# Patient Record
Sex: FEMALE
[De-identification: names, ages, dates, MRNs, and addresses within clinical notes are randomized per-mention and may not be internally consistent; named-entity substitution may affect disease eponyms.]

---

## 2017-02-15 ENCOUNTER — RX RENEWAL (OUTPATIENT)
Age: 62
End: 2017-02-15

## 2017-02-23 ENCOUNTER — APPOINTMENT (OUTPATIENT)
Dept: FAMILY MEDICINE | Facility: CLINIC | Age: 62
End: 2017-02-23

## 2017-03-28 ENCOUNTER — MEDICATION RENEWAL (OUTPATIENT)
Age: 62
End: 2017-03-28

## 2017-03-28 ENCOUNTER — APPOINTMENT (OUTPATIENT)
Dept: FAMILY MEDICINE | Facility: CLINIC | Age: 62
End: 2017-03-28

## 2017-03-28 ENCOUNTER — RX RENEWAL (OUTPATIENT)
Age: 62
End: 2017-03-28

## 2017-03-28 VITALS
SYSTOLIC BLOOD PRESSURE: 120 MMHG | DIASTOLIC BLOOD PRESSURE: 80 MMHG | HEIGHT: 61 IN | HEART RATE: 77 BPM | RESPIRATION RATE: 16 BRPM | BODY MASS INDEX: 37.19 KG/M2 | WEIGHT: 197 LBS | OXYGEN SATURATION: 98 %

## 2017-03-28 RX ORDER — CALCIPOTRIENE AND BETAMETHASONE DIPROPIONATE 50; .5 UG/G; MG/G
0.005-0.064 OINTMENT TOPICAL
Qty: 60 | Refills: 0 | Status: ACTIVE | COMMUNITY
Start: 2016-12-23

## 2017-04-12 ENCOUNTER — RX RENEWAL (OUTPATIENT)
Age: 62
End: 2017-04-12

## 2017-04-17 LAB
ALBUMIN SERPL ELPH-MCNC: 3.8 G/DL
ALP BLD-CCNC: 75 U/L
ALT SERPL-CCNC: 21 U/L
ANION GAP SERPL CALC-SCNC: 16 MMOL/L
APPEARANCE: CLEAR
AST SERPL-CCNC: 13 U/L
BASOPHILS # BLD AUTO: 0.05 K/UL
BASOPHILS NFR BLD AUTO: 0.5 %
BILIRUB SERPL-MCNC: 0.3 MG/DL
BILIRUBIN URINE: NEGATIVE
BLOOD URINE: NEGATIVE
BUN SERPL-MCNC: 12 MG/DL
CALCIUM SERPL-MCNC: 9.2 MG/DL
CHLORIDE SERPL-SCNC: 102 MMOL/L
CHOLEST SERPL-MCNC: 146 MG/DL
CHOLEST/HDLC SERPL: 2.6 RATIO
CO2 SERPL-SCNC: 24 MMOL/L
COLOR: YELLOW
CREAT SERPL-MCNC: 0.75 MG/DL
CREAT SPEC-SCNC: 98 MG/DL
EOSINOPHIL # BLD AUTO: 0.45 K/UL
EOSINOPHIL NFR BLD AUTO: 4.3 %
GLUCOSE QUALITATIVE U: NORMAL MG/DL
GLUCOSE SERPL-MCNC: 111 MG/DL
HBA1C MFR BLD HPLC: 7 %
HCT VFR BLD CALC: 40.6 %
HDLC SERPL-MCNC: 57 MG/DL
HGB BLD-MCNC: 12.7 G/DL
IMM GRANULOCYTES NFR BLD AUTO: 0.6 %
KETONES URINE: NEGATIVE
LDLC SERPL CALC-MCNC: 68 MG/DL
LEUKOCYTE ESTERASE URINE: NEGATIVE
LYMPHOCYTES # BLD AUTO: 3.82 K/UL
LYMPHOCYTES NFR BLD AUTO: 36.2 %
MAN DIFF?: NORMAL
MCHC RBC-ENTMCNC: 28 PG
MCHC RBC-ENTMCNC: 31.3 GM/DL
MCV RBC AUTO: 89.6 FL
MICROALBUMIN 24H UR DL<=1MG/L-MCNC: 0.9 MG/DL
MICROALBUMIN/CREAT 24H UR-RTO: 9 UG/MG
MONOCYTES # BLD AUTO: 0.64 K/UL
MONOCYTES NFR BLD AUTO: 6.1 %
NEUTROPHILS # BLD AUTO: 5.53 K/UL
NEUTROPHILS NFR BLD AUTO: 52.3 %
NITRITE URINE: NEGATIVE
PH URINE: 5
PLATELET # BLD AUTO: 381 K/UL
POTASSIUM SERPL-SCNC: 4.1 MMOL/L
PROT SERPL-MCNC: 7.7 G/DL
PROTEIN URINE: NEGATIVE MG/DL
RBC # BLD: 4.53 M/UL
RBC # FLD: 12.6 %
SODIUM SERPL-SCNC: 142 MMOL/L
SPECIFIC GRAVITY URINE: 1.02
T4 SERPL-MCNC: 9.6 UG/DL
TRIGL SERPL-MCNC: 105 MG/DL
TSH SERPL-ACNC: 0.12 UIU/ML
URATE SERPL-MCNC: 6.4 MG/DL
UROBILINOGEN URINE: NORMAL MG/DL
WBC # FLD AUTO: 10.55 K/UL

## 2017-04-18 ENCOUNTER — RX RENEWAL (OUTPATIENT)
Age: 62
End: 2017-04-18

## 2017-06-05 ENCOUNTER — APPOINTMENT (OUTPATIENT)
Dept: FAMILY MEDICINE | Facility: CLINIC | Age: 62
End: 2017-06-05

## 2017-06-05 VITALS
BODY MASS INDEX: 38.7 KG/M2 | HEART RATE: 84 BPM | OXYGEN SATURATION: 97 % | WEIGHT: 197.13 LBS | SYSTOLIC BLOOD PRESSURE: 138 MMHG | DIASTOLIC BLOOD PRESSURE: 78 MMHG | HEIGHT: 60 IN

## 2017-06-09 RX ORDER — CYANOCOBALAMIN 1000 UG/ML
1000 INJECTION INTRAMUSCULAR; SUBCUTANEOUS
Qty: 0 | Refills: 0 | Status: COMPLETED | OUTPATIENT
Start: 2017-06-09

## 2017-06-09 RX ADMIN — CYANOCOBALAMIN 0 MCG/ML: 1000 INJECTION INTRAMUSCULAR; SUBCUTANEOUS at 00:00

## 2017-07-13 ENCOUNTER — APPOINTMENT (OUTPATIENT)
Dept: FAMILY MEDICINE | Facility: CLINIC | Age: 62
End: 2017-07-13

## 2017-07-13 VITALS
BODY MASS INDEX: 38.68 KG/M2 | HEIGHT: 60 IN | SYSTOLIC BLOOD PRESSURE: 132 MMHG | DIASTOLIC BLOOD PRESSURE: 82 MMHG | WEIGHT: 197 LBS

## 2017-07-16 RX ORDER — METHYLPRED ACET/NACL,ISO-OS/PF 40 MG/ML
40 VIAL (ML) INJECTION
Qty: 1 | Refills: 0 | Status: COMPLETED | OUTPATIENT
Start: 2017-07-16

## 2017-07-16 RX ADMIN — METHYLPREDNISOLONE ACETATE 0 MG/ML: 40 INJECTION, SUSPENSION INTRA-ARTICULAR; INTRALESIONAL; INTRAMUSCULAR; SOFT TISSUE at 00:00

## 2017-08-28 ENCOUNTER — RX RENEWAL (OUTPATIENT)
Age: 62
End: 2017-08-28

## 2017-08-30 ENCOUNTER — MOBILE ON CALL (OUTPATIENT)
Age: 62
End: 2017-08-30

## 2017-09-05 ENCOUNTER — APPOINTMENT (OUTPATIENT)
Dept: FAMILY MEDICINE | Facility: CLINIC | Age: 62
End: 2017-09-05
Payer: COMMERCIAL

## 2017-09-05 ENCOUNTER — LABORATORY RESULT (OUTPATIENT)
Age: 62
End: 2017-09-05

## 2017-09-05 VITALS
HEIGHT: 60 IN | WEIGHT: 197 LBS | OXYGEN SATURATION: 98 % | TEMPERATURE: 98.2 F | RESPIRATION RATE: 16 BRPM | BODY MASS INDEX: 38.68 KG/M2 | SYSTOLIC BLOOD PRESSURE: 130 MMHG | HEART RATE: 69 BPM | DIASTOLIC BLOOD PRESSURE: 82 MMHG
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
## 2017-09-05 DIAGNOSIS — R53.83 OTHER FATIGUE: ICD-10-CM

## 2017-09-05 DIAGNOSIS — M76.32 ILIOTIBIAL BAND SYNDROME, LEFT LEG: ICD-10-CM

## 2017-09-05 DIAGNOSIS — M79.652 PAIN IN LEFT THIGH: ICD-10-CM

## 2017-09-05 PROCEDURE — 99214 OFFICE O/P EST MOD 30 MIN: CPT | Mod: 25

## 2017-09-05 PROCEDURE — 36415 COLL VENOUS BLD VENIPUNCTURE: CPT

## 2017-09-08 LAB
ALBUMIN SERPL ELPH-MCNC: 3.8 G/DL
ALP BLD-CCNC: 80 U/L
ALT SERPL-CCNC: 25 U/L
ANION GAP SERPL CALC-SCNC: 18 MMOL/L
APPEARANCE: ABNORMAL
AST SERPL-CCNC: 18 U/L
B BURGDOR IGG+IGM SER QL IB: NORMAL
BACTERIA: ABNORMAL
BASOPHILS # BLD AUTO: 0.04 K/UL
BASOPHILS NFR BLD AUTO: 0.4 %
BILIRUB SERPL-MCNC: 0.4 MG/DL
BILIRUBIN URINE: NEGATIVE
BLOOD URINE: NEGATIVE
BUN SERPL-MCNC: 12 MG/DL
CALCIUM OXALATE CRYSTALS: ABNORMAL
CALCIUM SERPL-MCNC: 9.2 MG/DL
CHLORIDE SERPL-SCNC: 102 MMOL/L
CHOLEST SERPL-MCNC: 177 MG/DL
CHOLEST/HDLC SERPL: 3.2 RATIO
CK SERPL-CCNC: 133 U/L
CO2 SERPL-SCNC: 22 MMOL/L
COLOR: YELLOW
CREAT SERPL-MCNC: 0.78 MG/DL
EOSINOPHIL # BLD AUTO: 0.19 K/UL
EOSINOPHIL NFR BLD AUTO: 1.9 %
GLUCOSE QUALITATIVE U: NORMAL MG/DL
GLUCOSE SERPL-MCNC: 111 MG/DL
HBA1C MFR BLD HPLC: 7.3 %
HCT VFR BLD CALC: 38.4 %
HDLC SERPL-MCNC: 55 MG/DL
HETEROPH AB SER QL: NEGATIVE
HGB BLD-MCNC: 12.3 G/DL
HYALINE CASTS: 0 /LPF
IMM GRANULOCYTES NFR BLD AUTO: 0.3 %
KETONES URINE: NEGATIVE
LDLC SERPL CALC-MCNC: 97 MG/DL
LEUKOCYTE ESTERASE URINE: ABNORMAL
LYMPHOCYTES # BLD AUTO: 3.94 K/UL
LYMPHOCYTES NFR BLD AUTO: 40.1 %
MAN DIFF?: NORMAL
MCHC RBC-ENTMCNC: 29.1 PG
MCHC RBC-ENTMCNC: 32 GM/DL
MCV RBC AUTO: 91 FL
MICROSCOPIC-UA: NORMAL
MONOCYTES # BLD AUTO: 0.52 K/UL
MONOCYTES NFR BLD AUTO: 5.3 %
NEUTROPHILS # BLD AUTO: 5.1 K/UL
NEUTROPHILS NFR BLD AUTO: 52 %
NITRITE URINE: NEGATIVE
PH URINE: 5
PLATELET # BLD AUTO: 327 K/UL
POTASSIUM SERPL-SCNC: 4.1 MMOL/L
PROT SERPL-MCNC: 7.9 G/DL
PROTEIN URINE: NEGATIVE MG/DL
RBC # BLD: 4.22 M/UL
RBC # FLD: 13.4 %
RED BLOOD CELLS URINE: 0 /HPF
SODIUM SERPL-SCNC: 142 MMOL/L
SPECIFIC GRAVITY URINE: 1.02
SQUAMOUS EPITHELIAL CELLS: 9 /HPF
T4 FREE SERPL-MCNC: 1.8 NG/DL
TRIGL SERPL-MCNC: 126 MG/DL
TSH SERPL-ACNC: 0.21 UIU/ML
UROBILINOGEN URINE: NORMAL MG/DL
VIT B12 SERPL-MCNC: 410 PG/ML
WBC # FLD AUTO: 9.82 K/UL
WHITE BLOOD CELLS URINE: 7 /HPF

## 2017-09-11 LAB
BABESIA ANTIBODIES, IGG: NORMAL
BABESIA ANTIBODIES, IGM: NORMAL

## 2017-09-14 ENCOUNTER — MED ADMIN CHARGE (OUTPATIENT)
Age: 62
End: 2017-09-14

## 2017-12-11 ENCOUNTER — RX RENEWAL (OUTPATIENT)
Age: 62
End: 2017-12-11

## 2017-12-12 ENCOUNTER — APPOINTMENT (OUTPATIENT)
Dept: FAMILY MEDICINE | Facility: CLINIC | Age: 62
End: 2017-12-12

## 2017-12-15 ENCOUNTER — RX RENEWAL (OUTPATIENT)
Age: 62
End: 2017-12-15

## 2017-12-20 ENCOUNTER — RX RENEWAL (OUTPATIENT)
Age: 62
End: 2017-12-20

## 2017-12-28 ENCOUNTER — APPOINTMENT (OUTPATIENT)
Dept: FAMILY MEDICINE | Facility: CLINIC | Age: 62
End: 2017-12-28
Payer: COMMERCIAL

## 2017-12-28 VITALS
WEIGHT: 196.38 LBS | DIASTOLIC BLOOD PRESSURE: 62 MMHG | BODY MASS INDEX: 38.56 KG/M2 | HEIGHT: 60 IN | SYSTOLIC BLOOD PRESSURE: 124 MMHG

## 2017-12-28 DIAGNOSIS — J01.00 ACUTE MAXILLARY SINUSITIS, UNSPECIFIED: ICD-10-CM

## 2017-12-28 PROCEDURE — 99214 OFFICE O/P EST MOD 30 MIN: CPT | Mod: 25

## 2017-12-28 PROCEDURE — 36415 COLL VENOUS BLD VENIPUNCTURE: CPT

## 2018-01-05 LAB
ALBUMIN SERPL ELPH-MCNC: 4.2 G/DL
ALP BLD-CCNC: 94 U/L
ALT SERPL-CCNC: 25 U/L
ANION GAP SERPL CALC-SCNC: 18 MMOL/L
AST SERPL-CCNC: 24 U/L
BILIRUB SERPL-MCNC: 0.3 MG/DL
BUN SERPL-MCNC: 14 MG/DL
CALCIUM SERPL-MCNC: 9.6 MG/DL
CHLORIDE SERPL-SCNC: 99 MMOL/L
CHOLEST SERPL-MCNC: 173 MG/DL
CHOLEST/HDLC SERPL: 3.1 RATIO
CO2 SERPL-SCNC: 25 MMOL/L
CREAT SERPL-MCNC: 1.11 MG/DL
GLUCOSE SERPL-MCNC: 208 MG/DL
HBA1C MFR BLD HPLC: 7.2 %
HDLC SERPL-MCNC: 56 MG/DL
LDLC SERPL CALC-MCNC: 71 MG/DL
POTASSIUM SERPL-SCNC: 4.4 MMOL/L
PROT SERPL-MCNC: 8.2 G/DL
SODIUM SERPL-SCNC: 142 MMOL/L
T4 FREE SERPL-MCNC: 1.6 NG/DL
TRIGL SERPL-MCNC: 231 MG/DL
TSH SERPL-ACNC: 1.62 UIU/ML
VIT B12 SERPL-MCNC: >2000 PG/ML

## 2018-03-26 ENCOUNTER — APPOINTMENT (OUTPATIENT)
Dept: FAMILY MEDICINE | Facility: CLINIC | Age: 63
End: 2018-03-26

## 2018-03-27 ENCOUNTER — APPOINTMENT (OUTPATIENT)
Dept: FAMILY MEDICINE | Facility: CLINIC | Age: 63
End: 2018-03-27
Payer: COMMERCIAL

## 2018-03-27 VITALS — DIASTOLIC BLOOD PRESSURE: 88 MMHG | SYSTOLIC BLOOD PRESSURE: 132 MMHG

## 2018-03-27 VITALS
WEIGHT: 200 LBS | DIASTOLIC BLOOD PRESSURE: 90 MMHG | BODY MASS INDEX: 39.27 KG/M2 | SYSTOLIC BLOOD PRESSURE: 140 MMHG | HEIGHT: 60 IN

## 2018-03-27 PROCEDURE — 36415 COLL VENOUS BLD VENIPUNCTURE: CPT

## 2018-03-27 PROCEDURE — 99214 OFFICE O/P EST MOD 30 MIN: CPT | Mod: 25

## 2018-03-27 RX ORDER — AMOXICILLIN 875 MG/1
875 TABLET, FILM COATED ORAL
Qty: 1 | Refills: 0 | Status: COMPLETED | COMMUNITY
Start: 2017-12-28 | End: 2018-03-27

## 2018-03-27 RX ORDER — PREDNISONE 20 MG/1
20 TABLET ORAL DAILY
Qty: 30 | Refills: 0 | Status: COMPLETED | COMMUNITY
Start: 2017-07-13 | End: 2018-03-27

## 2018-04-03 LAB
ALBUMIN SERPL ELPH-MCNC: 4.1 G/DL
ALP BLD-CCNC: 85 U/L
ALT SERPL-CCNC: 19 U/L
ANION GAP SERPL CALC-SCNC: 13 MMOL/L
AST SERPL-CCNC: 19 U/L
BILIRUB SERPL-MCNC: <0.2 MG/DL
BUN SERPL-MCNC: 17 MG/DL
CALCIUM SERPL-MCNC: 9.4 MG/DL
CHLORIDE SERPL-SCNC: 103 MMOL/L
CHOLEST SERPL-MCNC: 184 MG/DL
CHOLEST/HDLC SERPL: 3.6 RATIO
CO2 SERPL-SCNC: 24 MMOL/L
CREAT SERPL-MCNC: 0.82 MG/DL
GLUCOSE SERPL-MCNC: 107 MG/DL
HBA1C MFR BLD HPLC: 7.1 %
HDLC SERPL-MCNC: 51 MG/DL
LDLC SERPL CALC-MCNC: 100 MG/DL
POTASSIUM SERPL-SCNC: 4.4 MMOL/L
PROT SERPL-MCNC: 7.9 G/DL
SODIUM SERPL-SCNC: 140 MMOL/L
T4 FREE SERPL-MCNC: 1.4 NG/DL
TRIGL SERPL-MCNC: 165 MG/DL
TSH SERPL-ACNC: 3.4 UIU/ML
VIT B12 SERPL-MCNC: 443 PG/ML

## 2018-05-03 ENCOUNTER — APPOINTMENT (OUTPATIENT)
Dept: FAMILY MEDICINE | Facility: CLINIC | Age: 63
End: 2018-05-03
Payer: COMMERCIAL

## 2018-05-03 VITALS
BODY MASS INDEX: 39.27 KG/M2 | DIASTOLIC BLOOD PRESSURE: 72 MMHG | OXYGEN SATURATION: 98 % | WEIGHT: 200 LBS | SYSTOLIC BLOOD PRESSURE: 136 MMHG | RESPIRATION RATE: 16 BRPM | HEART RATE: 75 BPM | HEIGHT: 60 IN

## 2018-05-03 DIAGNOSIS — R29.898 OTHER SYMPTOMS AND SIGNS INVOLVING THE MUSCULOSKELETAL SYSTEM: ICD-10-CM

## 2018-05-03 DIAGNOSIS — M25.511 PAIN IN RIGHT SHOULDER: ICD-10-CM

## 2018-05-03 PROCEDURE — 99203 OFFICE O/P NEW LOW 30 MIN: CPT

## 2018-06-11 ENCOUNTER — RX RENEWAL (OUTPATIENT)
Age: 63
End: 2018-06-11

## 2018-06-25 ENCOUNTER — RX RENEWAL (OUTPATIENT)
Age: 63
End: 2018-06-25

## 2018-06-27 ENCOUNTER — RX RENEWAL (OUTPATIENT)
Age: 63
End: 2018-06-27

## 2018-09-04 ENCOUNTER — APPOINTMENT (OUTPATIENT)
Dept: FAMILY MEDICINE | Facility: CLINIC | Age: 63
End: 2018-09-04
Payer: MEDICARE

## 2018-09-04 VITALS
DIASTOLIC BLOOD PRESSURE: 68 MMHG | OXYGEN SATURATION: 98 % | HEIGHT: 61 IN | SYSTOLIC BLOOD PRESSURE: 118 MMHG | HEART RATE: 68 BPM | BODY MASS INDEX: 37.41 KG/M2 | WEIGHT: 198.13 LBS
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
## 2018-09-04 PROCEDURE — 96372 THER/PROPH/DIAG INJ SC/IM: CPT

## 2018-09-04 PROCEDURE — 99214 OFFICE O/P EST MOD 30 MIN: CPT | Mod: 25

## 2018-09-04 RX ORDER — CYANOCOBALAMIN 1000 UG/ML
1000 INJECTION INTRAMUSCULAR; SUBCUTANEOUS
Qty: 0 | Refills: 0 | Status: COMPLETED | OUTPATIENT
Start: 2018-09-04

## 2018-09-04 RX ORDER — OMEPRAZOLE 20 MG/1
20 CAPSULE, DELAYED RELEASE ORAL DAILY
Qty: 20 | Refills: 0 | Status: COMPLETED | COMMUNITY
Start: 2017-07-13 | End: 2018-09-04

## 2018-09-04 RX ORDER — HYDROCORTISONE 10 MG/G
1 CREAM TOPICAL TWICE DAILY
Qty: 1 | Refills: 0 | Status: COMPLETED | COMMUNITY
Start: 2017-07-13 | End: 2018-09-04

## 2018-09-04 RX ADMIN — CYANOCOBALAMIN 0 MCG/ML: 1000 INJECTION INTRAMUSCULAR; SUBCUTANEOUS at 00:00

## 2018-09-04 NOTE — ASSESSMENT
[FreeTextEntry1] : DM- refill metformin, glimepiride. check hba1c\par HLD - refill statin, check flp, hepatic function\par htn- refill carvedilol\par hypothyroidism - refill levothyroxine, check TFTs\par Low back pain w/ sciatica - refill ibuprofen prn\par b12 defic - cyanocobalamin 1000 administered, pt consent given before administration and after discussion of risks/benefits, pt tolerated well.\par bug bites - hydocortisone bid prn

## 2018-09-04 NOTE — HISTORY OF PRESENT ILLNESS
[de-identified] : Pt for f/u multiple problems. Pt's DM stable, has not improved diet significantly. Pt sees ophtho q 3 months. Pt needs refills of meds for hld, htn, hypothyroidism. Denies CP, palpitations, dyspnea, n/v\par Pt c/o itchy eyes for past few weeks. Pt c/o mosquito bites would like cortisone cream.

## 2018-09-05 ENCOUNTER — RX RENEWAL (OUTPATIENT)
Age: 63
End: 2018-09-05

## 2018-09-06 LAB
ALBUMIN SERPL ELPH-MCNC: 4.2 G/DL
ALP BLD-CCNC: 78 U/L
ALT SERPL-CCNC: 17 U/L
ANION GAP SERPL CALC-SCNC: 16 MMOL/L
AST SERPL-CCNC: 23 U/L
BILIRUB SERPL-MCNC: 0.2 MG/DL
BUN SERPL-MCNC: 16 MG/DL
CALCIUM SERPL-MCNC: 9.6 MG/DL
CHLORIDE SERPL-SCNC: 104 MMOL/L
CHOLEST SERPL-MCNC: 136 MG/DL
CHOLEST/HDLC SERPL: 2.8 RATIO
CO2 SERPL-SCNC: 24 MMOL/L
CREAT SERPL-MCNC: 0.92 MG/DL
GLUCOSE SERPL-MCNC: 97 MG/DL
HBA1C MFR BLD HPLC: 6.5 %
HDLC SERPL-MCNC: 48 MG/DL
LDLC SERPL CALC-MCNC: 67 MG/DL
POTASSIUM SERPL-SCNC: 4.1 MMOL/L
PROT SERPL-MCNC: 8 G/DL
SODIUM SERPL-SCNC: 144 MMOL/L
T4 FREE SERPL-MCNC: 2.1 NG/DL
TRIGL SERPL-MCNC: 107 MG/DL
TSH SERPL-ACNC: 0.28 UIU/ML

## 2018-09-13 ENCOUNTER — RX RENEWAL (OUTPATIENT)
Age: 63
End: 2018-09-13

## 2018-09-14 ENCOUNTER — APPOINTMENT (OUTPATIENT)
Dept: FAMILY MEDICINE | Facility: CLINIC | Age: 63
End: 2018-09-14

## 2018-10-01 ENCOUNTER — MEDICATION RENEWAL (OUTPATIENT)
Age: 63
End: 2018-10-01

## 2018-10-01 ENCOUNTER — RX RENEWAL (OUTPATIENT)
Age: 63
End: 2018-10-01

## 2018-10-01 RX ORDER — BLOOD-GLUCOSE METER
70 EACH MISCELLANEOUS
Qty: 1 | Refills: 2 | Status: ACTIVE | COMMUNITY
Start: 2017-03-28 | End: 1900-01-01

## 2018-10-05 ENCOUNTER — RX RENEWAL (OUTPATIENT)
Age: 63
End: 2018-10-05

## 2018-11-19 ENCOUNTER — APPOINTMENT (OUTPATIENT)
Dept: FAMILY MEDICINE | Facility: CLINIC | Age: 63
End: 2018-11-19

## 2019-02-15 ENCOUNTER — APPOINTMENT (OUTPATIENT)
Dept: FAMILY MEDICINE | Facility: CLINIC | Age: 64
End: 2019-02-15
Payer: MEDICARE

## 2019-02-15 VITALS
DIASTOLIC BLOOD PRESSURE: 74 MMHG | HEIGHT: 61 IN | SYSTOLIC BLOOD PRESSURE: 108 MMHG | TEMPERATURE: 98.3 F | WEIGHT: 205 LBS | HEART RATE: 88 BPM | OXYGEN SATURATION: 98 % | RESPIRATION RATE: 16 BRPM | BODY MASS INDEX: 38.71 KG/M2

## 2019-02-15 DIAGNOSIS — L30.9 DERMATITIS, UNSPECIFIED: ICD-10-CM

## 2019-02-15 PROCEDURE — 96372 THER/PROPH/DIAG INJ SC/IM: CPT

## 2019-02-15 PROCEDURE — 99214 OFFICE O/P EST MOD 30 MIN: CPT | Mod: 25

## 2019-02-17 RX ORDER — CYANOCOBALAMIN 1000 UG/ML
1000 INJECTION INTRAMUSCULAR; SUBCUTANEOUS
Qty: 0 | Refills: 0 | Status: COMPLETED | OUTPATIENT
Start: 2019-02-17

## 2019-02-17 RX ADMIN — CYANOCOBALAMIN 0 MCG/ML: 1000 INJECTION INTRAMUSCULAR; SUBCUTANEOUS at 00:00

## 2019-02-17 NOTE — ASSESSMENT
[FreeTextEntry1] : DM- refill metformin, glimepiride. check hba1c, microalbumin, cr\par HLD - refill statin, check flp, hepatic function\par htn- refill carvedilol\par hypothyroidism - refill levothyroxine, check TFTs\par chronic R knee pain - refill ibuprofen prn\par b12 defic - cyanocobalamin 1000 administered, pt consent given before administration and after discussion of risks/benefits, pt tolerated well.\par dermatitis - hydocortisone bid prn

## 2019-02-17 NOTE — HISTORY OF PRESENT ILLNESS
[de-identified] : Pt for f/u multiple problems. Pt's DM stable, glucose fasting 100-130. Pt sees ophtho q 3 months. Pt needs refills of meds for hld, htn, hypothyroidism. Denies CP, palpitations, dyspnea, n/v. She has hx of b12 defic would like to get another B12 shot today.\par Pt has eczema of her ears, would like refill of cortisone cream.\par Pt has chronic R knee pain, needs refill of ibuprofen prn

## 2019-06-18 ENCOUNTER — APPOINTMENT (OUTPATIENT)
Dept: FAMILY MEDICINE | Facility: CLINIC | Age: 64
End: 2019-06-18
Payer: MEDICARE

## 2019-06-18 VITALS
DIASTOLIC BLOOD PRESSURE: 78 MMHG | HEIGHT: 61 IN | OXYGEN SATURATION: 98 % | BODY MASS INDEX: 37.76 KG/M2 | WEIGHT: 200 LBS | SYSTOLIC BLOOD PRESSURE: 120 MMHG | HEART RATE: 76 BPM

## 2019-06-18 PROCEDURE — 99214 OFFICE O/P EST MOD 30 MIN: CPT | Mod: 25

## 2019-06-18 PROCEDURE — 96372 THER/PROPH/DIAG INJ SC/IM: CPT

## 2019-06-18 RX ORDER — CYANOCOBALAMIN 1000 UG/ML
1000 INJECTION INTRAMUSCULAR; SUBCUTANEOUS
Qty: 0 | Refills: 0 | Status: COMPLETED | OUTPATIENT
Start: 2019-06-18

## 2019-06-18 RX ADMIN — CYANOCOBALAMIN 0 MCG/ML: 1000 INJECTION INTRAMUSCULAR; SUBCUTANEOUS at 00:00

## 2019-06-19 LAB
ALBUMIN SERPL ELPH-MCNC: 4.3 G/DL
ALP BLD-CCNC: 80 U/L
ALT SERPL-CCNC: 24 U/L
ANION GAP SERPL CALC-SCNC: 14 MMOL/L
AST SERPL-CCNC: 17 U/L
BILIRUB SERPL-MCNC: 0.4 MG/DL
BUN SERPL-MCNC: 18 MG/DL
CALCIUM SERPL-MCNC: 9.6 MG/DL
CHLORIDE SERPL-SCNC: 103 MMOL/L
CHOLEST SERPL-MCNC: 149 MG/DL
CHOLEST/HDLC SERPL: 2.6 RATIO
CO2 SERPL-SCNC: 25 MMOL/L
CREAT SERPL-MCNC: 0.74 MG/DL
ESTIMATED AVERAGE GLUCOSE: 143 MG/DL
FOLATE SERPL-MCNC: 11.1 NG/ML
GLUCOSE SERPL-MCNC: 152 MG/DL
HBA1C MFR BLD HPLC: 6.6 %
HDLC SERPL-MCNC: 57 MG/DL
LDLC SERPL CALC-MCNC: 69 MG/DL
POTASSIUM SERPL-SCNC: 4.5 MMOL/L
PROT SERPL-MCNC: 7.9 G/DL
SODIUM SERPL-SCNC: 142 MMOL/L
T4 FREE SERPL-MCNC: 1.4 NG/DL
TRIGL SERPL-MCNC: 115 MG/DL
TSH SERPL-ACNC: 0.28 UIU/ML
VIT B12 SERPL-MCNC: >2000 PG/ML

## 2019-06-19 NOTE — HISTORY OF PRESENT ILLNESS
[de-identified] : Pt for f/u multiple problems. Pt getting over laryngitis for the past few days. Pt taking medrol michael at this time. Pt's DM stable, glucose fasting 100-130. Pt sees ophtho q 3 months. Pt needs refills of meds for hld, htn, hypothyroidism. Denies CP, palpitations, dyspnea, n/v. She has hx of b12 defic would like to get another B12 shot today.\par Pt sometimes feels numbness in first 3 digits of R hand when awakening\par Pt has chronic R knee pain, needs refill of ibuprofen prn

## 2019-06-19 NOTE — ASSESSMENT
[FreeTextEntry1] : DM- refill metformin, glimepiride. check hba1c\par HLD - refill statin, check flp, hepatic function\par htn- refill carvedilol\par hypothyroidism - refill levothyroxine, check TFTs\par chronic R knee pain - refill ibuprofen prn\par b12 defic - cyanocobalamin 1000 administered, pt consent given before administration and after discussion of risks/benefits, pt tolerated well.

## 2019-06-19 NOTE — REVIEW OF SYSTEMS
[Hoarseness] : hoarseness [Joint Pain] : joint pain [Negative] : Psychiatric [de-identified] : see hpi

## 2019-08-19 ENCOUNTER — RX RENEWAL (OUTPATIENT)
Age: 64
End: 2019-08-19

## 2019-09-09 ENCOUNTER — APPOINTMENT (OUTPATIENT)
Dept: FAMILY MEDICINE | Facility: CLINIC | Age: 64
End: 2019-09-09

## 2019-09-12 ENCOUNTER — APPOINTMENT (OUTPATIENT)
Dept: FAMILY MEDICINE | Facility: CLINIC | Age: 64
End: 2019-09-12
Payer: MEDICARE

## 2019-09-12 VITALS
DIASTOLIC BLOOD PRESSURE: 72 MMHG | HEART RATE: 69 BPM | SYSTOLIC BLOOD PRESSURE: 128 MMHG | OXYGEN SATURATION: 98 % | HEIGHT: 61 IN | WEIGHT: 193.5 LBS | BODY MASS INDEX: 36.53 KG/M2

## 2019-09-12 DIAGNOSIS — L29.9 PRURITUS, UNSPECIFIED: ICD-10-CM

## 2019-09-12 PROCEDURE — 99214 OFFICE O/P EST MOD 30 MIN: CPT

## 2019-09-15 NOTE — HISTORY OF PRESENT ILLNESS
[FreeTextEntry8] : Pt c/o spells of severe itching in b/l LE and arms x1 month. Pt applying hydrocortisone which didn't help. Denies new foods, lotions, soaps. \par Pt also f/u DM, HLD, HTN. Pt following improved diet and has lost 7 lbs in past 3 months. Pt compliant w/ meds, needs recheck of labs.

## 2019-09-15 NOTE — ASSESSMENT
[FreeTextEntry1] : generalized pruritis - start hydroxyzine TID. Possible adverse effects discussed with pt. if no improvement f/u with derm. check cmp, cbc\par DM- cont metformin, glimepiride. check hba1c. cont carb controlled diet for wt loss\par HLD - cont statin, check flp, hepatic function\par htn- cont carvedilol\par hypothyroidism - cont levothyroxine, check TFTs

## 2019-09-23 LAB
ALBUMIN SERPL ELPH-MCNC: 4 G/DL
ALP BLD-CCNC: 78 U/L
ALT SERPL-CCNC: 16 U/L
ANION GAP SERPL CALC-SCNC: 11 MMOL/L
AST SERPL-CCNC: 28 U/L
BASOPHILS # BLD AUTO: 0.12 K/UL
BASOPHILS NFR BLD AUTO: 1.4 %
BILIRUB SERPL-MCNC: 0.4 MG/DL
BUN SERPL-MCNC: 9 MG/DL
CALCIUM SERPL-MCNC: 9.3 MG/DL
CHLORIDE SERPL-SCNC: 102 MMOL/L
CHOLEST SERPL-MCNC: 161 MG/DL
CHOLEST/HDLC SERPL: 3.2 RATIO
CO2 SERPL-SCNC: 26 MMOL/L
CREAT SERPL-MCNC: 0.69 MG/DL
EOSINOPHIL # BLD AUTO: 0.61 K/UL
EOSINOPHIL NFR BLD AUTO: 7.3 %
ESTIMATED AVERAGE GLUCOSE: 123 MG/DL
GLUCOSE SERPL-MCNC: 134 MG/DL
HBA1C MFR BLD HPLC: 5.9 %
HCT VFR BLD CALC: 43.2 %
HDLC SERPL-MCNC: 51 MG/DL
HGB BLD-MCNC: 13.2 G/DL
IMM GRANULOCYTES NFR BLD AUTO: 0.4 %
LDLC SERPL CALC-MCNC: 74 MG/DL
LYMPHOCYTES # BLD AUTO: 2.81 K/UL
LYMPHOCYTES NFR BLD AUTO: 33.5 %
MAN DIFF?: NORMAL
MCHC RBC-ENTMCNC: 27.7 PG
MCHC RBC-ENTMCNC: 30.6 GM/DL
MCV RBC AUTO: 90.8 FL
MONOCYTES # BLD AUTO: 0.61 K/UL
MONOCYTES NFR BLD AUTO: 7.3 %
NEUTROPHILS # BLD AUTO: 4.2 K/UL
NEUTROPHILS NFR BLD AUTO: 50.1 %
PLATELET # BLD AUTO: 337 K/UL
POTASSIUM SERPL-SCNC: 5.6 MMOL/L
PROT SERPL-MCNC: 7.8 G/DL
RBC # BLD: 4.76 M/UL
RBC # FLD: 13.3 %
SODIUM SERPL-SCNC: 139 MMOL/L
T4 FREE SERPL-MCNC: 1.6 NG/DL
TRIGL SERPL-MCNC: 180 MG/DL
TSH SERPL-ACNC: 0.57 UIU/ML
WBC # FLD AUTO: 8.38 K/UL

## 2019-10-22 ENCOUNTER — APPOINTMENT (OUTPATIENT)
Dept: FAMILY MEDICINE | Facility: CLINIC | Age: 64
End: 2019-10-22
Payer: MEDICARE

## 2019-10-22 VITALS
SYSTOLIC BLOOD PRESSURE: 118 MMHG | HEART RATE: 76 BPM | OXYGEN SATURATION: 98 % | TEMPERATURE: 97.9 F | DIASTOLIC BLOOD PRESSURE: 80 MMHG | BODY MASS INDEX: 36.44 KG/M2 | HEIGHT: 61 IN | RESPIRATION RATE: 16 BRPM | WEIGHT: 193 LBS

## 2019-10-22 DIAGNOSIS — M25.562 PAIN IN LEFT KNEE: ICD-10-CM

## 2019-10-22 PROCEDURE — 99213 OFFICE O/P EST LOW 20 MIN: CPT

## 2019-10-23 ENCOUNTER — MED ADMIN CHARGE (OUTPATIENT)
Age: 64
End: 2019-10-23

## 2019-10-23 RX ORDER — METHYLPRED ACET/NACL,ISO-OS/PF 40 MG/ML
40 VIAL (ML) INJECTION
Qty: 1 | Refills: 0 | Status: COMPLETED | OUTPATIENT
Start: 2019-10-23

## 2019-10-23 RX ADMIN — METHYLPREDNISOLONE ACETATE 0 MG/ML: 40 INJECTION, SUSPENSION INTRA-ARTICULAR; INTRALESIONAL; INTRAMUSCULAR; SOFT TISSUE at 00:00

## 2019-10-23 NOTE — HISTORY OF PRESENT ILLNESS
[FreeTextEntry8] : Pt c/o acutely worsened chronic L knee pain. Pt has chronic L knee pain but has worsened in past sevearl days. Pt has had to use walker as she is afraid she will fall when walking. Pt feels unsteady due to pain in knee. Pt taking ibuprofen 600mg prn which helps slightly but still in significant pain.

## 2019-10-23 NOTE — ASSESSMENT
[FreeTextEntry1] : L knee pain - depomedrol 40mg IM administered, pt consent given before administration and after discussion of risks/benefits, pt tolerated well. then start medrol michael tomorrow. monitor glucose closely. if no improvement will consider doing imaging

## 2020-04-02 ENCOUNTER — APPOINTMENT (OUTPATIENT)
Dept: FAMILY MEDICINE | Facility: CLINIC | Age: 65
End: 2020-04-02

## 2020-05-04 ENCOUNTER — NON-APPOINTMENT (OUTPATIENT)
Age: 65
End: 2020-05-04

## 2020-05-04 ENCOUNTER — APPOINTMENT (OUTPATIENT)
Dept: FAMILY MEDICINE | Facility: CLINIC | Age: 65
End: 2020-05-04
Payer: MEDICARE

## 2020-05-04 VITALS
BODY MASS INDEX: 39.27 KG/M2 | HEIGHT: 60 IN | WEIGHT: 200 LBS | SYSTOLIC BLOOD PRESSURE: 136 MMHG | HEART RATE: 79 BPM | OXYGEN SATURATION: 98 % | DIASTOLIC BLOOD PRESSURE: 78 MMHG

## 2020-05-04 DIAGNOSIS — K21.9 GASTRO-ESOPHAGEAL REFLUX DISEASE W/OUT ESOPHAGITIS: ICD-10-CM

## 2020-05-04 PROCEDURE — 93000 ELECTROCARDIOGRAM COMPLETE: CPT

## 2020-05-04 PROCEDURE — 99213 OFFICE O/P EST LOW 20 MIN: CPT | Mod: 25

## 2020-05-04 PROCEDURE — G0438: CPT

## 2020-05-04 PROCEDURE — 96372 THER/PROPH/DIAG INJ SC/IM: CPT

## 2020-05-05 LAB
ALBUMIN SERPL ELPH-MCNC: 4.4 G/DL
ALP BLD-CCNC: 77 U/L
ALT SERPL-CCNC: 12 U/L
ANION GAP SERPL CALC-SCNC: 13 MMOL/L
APPEARANCE: CLEAR
AST SERPL-CCNC: 12 U/L
BACTERIA: ABNORMAL
BASOPHILS # BLD AUTO: 0.07 K/UL
BASOPHILS NFR BLD AUTO: 0.8 %
BILIRUB SERPL-MCNC: 0.2 MG/DL
BILIRUBIN URINE: NEGATIVE
BLOOD URINE: NEGATIVE
BUN SERPL-MCNC: 14 MG/DL
CALCIUM SERPL-MCNC: 9.3 MG/DL
CHLORIDE SERPL-SCNC: 103 MMOL/L
CHOLEST SERPL-MCNC: 138 MG/DL
CHOLEST/HDLC SERPL: 2.5 RATIO
CO2 SERPL-SCNC: 25 MMOL/L
COLOR: NORMAL
CREAT SERPL-MCNC: 0.69 MG/DL
EOSINOPHIL # BLD AUTO: 0.29 K/UL
EOSINOPHIL NFR BLD AUTO: 3.1 %
ESTIMATED AVERAGE GLUCOSE: 134 MG/DL
GLUCOSE QUALITATIVE U: NEGATIVE
GLUCOSE SERPL-MCNC: 132 MG/DL
HBA1C MFR BLD HPLC: 6.3 %
HCT VFR BLD CALC: 40.5 %
HDLC SERPL-MCNC: 56 MG/DL
HGB BLD-MCNC: 12.4 G/DL
HYALINE CASTS: 2 /LPF
IMM GRANULOCYTES NFR BLD AUTO: 0.5 %
KETONES URINE: NEGATIVE
LDLC SERPL CALC-MCNC: 56 MG/DL
LEUKOCYTE ESTERASE URINE: ABNORMAL
LYMPHOCYTES # BLD AUTO: 2.99 K/UL
LYMPHOCYTES NFR BLD AUTO: 32.5 %
MAN DIFF?: NORMAL
MCHC RBC-ENTMCNC: 28.2 PG
MCHC RBC-ENTMCNC: 30.6 GM/DL
MCV RBC AUTO: 92.3 FL
MICROSCOPIC-UA: NORMAL
MONOCYTES # BLD AUTO: 0.59 K/UL
MONOCYTES NFR BLD AUTO: 6.4 %
NEUTROPHILS # BLD AUTO: 5.22 K/UL
NEUTROPHILS NFR BLD AUTO: 56.7 %
NITRITE URINE: NEGATIVE
PH URINE: 5.5
PLATELET # BLD AUTO: 329 K/UL
POTASSIUM SERPL-SCNC: 4.3 MMOL/L
PROT SERPL-MCNC: 7.1 G/DL
PROTEIN URINE: NEGATIVE
RBC # BLD: 4.39 M/UL
RBC # FLD: 12.6 %
RED BLOOD CELLS URINE: 1 /HPF
SODIUM SERPL-SCNC: 140 MMOL/L
SPECIFIC GRAVITY URINE: 1.02
SQUAMOUS EPITHELIAL CELLS: 4 /HPF
T4 FREE SERPL-MCNC: 1.9 NG/DL
TRIGL SERPL-MCNC: 129 MG/DL
TSH SERPL-ACNC: 0.13 UIU/ML
UROBILINOGEN URINE: NORMAL
VIT B12 SERPL-MCNC: >2000 PG/ML
WBC # FLD AUTO: 9.21 K/UL
WHITE BLOOD CELLS URINE: 4 /HPF

## 2020-05-05 RX ORDER — CYANOCOBALAMIN 1000 UG/ML
1000 INJECTION INTRAMUSCULAR; SUBCUTANEOUS
Qty: 0 | Refills: 0 | Status: COMPLETED | OUTPATIENT
Start: 2020-05-05

## 2020-05-05 RX ADMIN — CYANOCOBALAMIN 0 MCG/ML: 1000 INJECTION INTRAMUSCULAR; SUBCUTANEOUS at 00:00

## 2020-05-05 NOTE — HEALTH RISK ASSESSMENT
[Very Good] : ~his/her~ current health as very good [Fair] :  ~his/her~ mood as fair [No] : No [0] : 1) Little interest or pleasure doing things: Not at all (0) [No falls in past year] : Patient reported no falls in the past year [1] : 2) Feeling down, depressed, or hopeless for several days (1) [No Retinopathy] : No retinopathy [Patient reported mammogram was normal] : Patient reported mammogram was normal [Patient declined PAP Smear] : Patient declined PAP Smear [With Family] : lives with family [] :  [Feels Safe at Home] : Feels safe at home [Fully functional (using the telephone, shopping, preparing meals, housekeeping, doing laundry, using] : Fully functional and needs no help or supervision to perform IADLs (using the telephone, shopping, preparing meals, housekeeping, doing laundry, using transportation, managing medications and managing finances) [Fully functional (bathing, dressing, toileting, transferring, walking, feeding)] : Fully functional (bathing, dressing, toileting, transferring, walking, feeding) [Reports normal functional visual acuity (ie: able to read med bottle)] : Reports normal functional visual acuity [Reports changes in dental health] : Reports changes in dental health [Smoke Detector] : smoke detector [Carbon Monoxide Detector] : carbon monoxide detector [Seat Belt] :  uses seat belt [With Patient/Caregiver] : With Patient/Caregiver [Designated Healthcare Proxy] : Designated healthcare proxy [Name: ___] : Health Care Proxy's Name: [unfilled]  [Relationship: ___] : Relationship: [unfilled] [Aggressive treatment] : aggressive treatment [] : No [EyeExamDate] : 03/20 [Language] : denies difficulty with language [Change in mental status noted] : No change in mental status noted [Handling Complex Tasks] : denies difficulty handling complex tasks [Learning/Retaining New Information] : denies difficulty learning/retaining new information [Behavior] : denies difficulty with behavior [Reports changes in hearing] : Reports no changes in hearing [Spatial Ability and Orientation] : denies difficulty with spatial ability and orientation [Reasoning] : denies difficulty with reasoning [Reports changes in vision] : Reports no changes in vision [ColonoscopyComments] : overdue, pt has family hx of colon CA in her brother [BoneDensityComments] : ordered [MammogramDate] : 03/18 [de-identified] : pt sees ophtho [AdvancecareDate] : 05/20

## 2020-05-05 NOTE — HISTORY OF PRESENT ILLNESS
[de-identified] : Pt in office for CPE/AWV. Pt has been feeling well. Pt has been feeling down as her nephew has recently been arrested. Pt also f/u DM, HLD, HTN, b12 defic. Pt has not been following improved diet and has regained 7 lbs in past few months. Fasting glucose usually 90-120s. Pt compliant w/ meds, needs recheck of labs. Denies CP, palpitations, dyspnea, n/v.\par Nonsmoker\par ETOH use denies\par Drug use denies\par Exercises never\par Diet poor high carb/portions\par

## 2020-05-05 NOTE — ASSESSMENT
[FreeTextEntry1] : hypothyroidism - refill levothyroxine, check TFTs\par b12 defic - Administered cyanocobalamin 1000mcg IM after patient consent was given after risks and benefits discussed. Pt tolerated well. recheck b12 level\par HLD - cont statin, check flp, hepatic function\par DM - cont glimepiride, check a1c\par htn- cont meds, controlled today. check labs\par refer to cardio for eval as pt has multiple risk factors for CAD\par declines prevnar today\par Healthy diet and regular exercise regimen discussed w/ pt.\par Screening labs ordered\par flu vaccine in the fall recommended\par referral to GI/colorectal for screening colonoscopy\par Advised routine pap smear and mammogram. pt refuses further pap smears\par Any questions call office

## 2020-05-19 ENCOUNTER — APPOINTMENT (OUTPATIENT)
Dept: FAMILY MEDICINE | Facility: CLINIC | Age: 65
End: 2020-05-19
Payer: MEDICARE

## 2020-05-19 VITALS
HEIGHT: 60 IN | SYSTOLIC BLOOD PRESSURE: 130 MMHG | WEIGHT: 195 LBS | DIASTOLIC BLOOD PRESSURE: 80 MMHG | BODY MASS INDEX: 38.28 KG/M2 | HEART RATE: 72 BPM | OXYGEN SATURATION: 98 % | RESPIRATION RATE: 16 BRPM

## 2020-05-19 DIAGNOSIS — M25.531 PAIN IN RIGHT WRIST: ICD-10-CM

## 2020-05-19 PROCEDURE — 99213 OFFICE O/P EST LOW 20 MIN: CPT | Mod: 25

## 2020-05-19 RX ORDER — METHYLPRED ACET/NACL,ISO-OS/PF 40 MG/ML
40 VIAL (ML) INJECTION
Qty: 1 | Refills: 0 | Status: COMPLETED | OUTPATIENT
Start: 2020-05-19

## 2020-05-19 RX ADMIN — METHYLPREDNISOLONE ACETATE 0 MG/ML: 40 INJECTION, SUSPENSION INTRA-ARTICULAR; INTRALESIONAL; INTRAMUSCULAR; SOFT TISSUE at 00:00

## 2020-05-19 NOTE — ASSESSMENT
[FreeTextEntry1] : R wrist strain - depomedrol 40mg IM administered, pt consent given before administration and after discussion of risks/benefits, pt tolerated well. start medrol michael tomorrow. ibuprofen prn.

## 2020-05-19 NOTE — HISTORY OF PRESENT ILLNESS
[FreeTextEntry8] : Pt c/o R volar wrist pain x 3 days after doing yard work. Pt taking tylenol and ibuprofen alternating with improvement. Pt has been wearing wrist brace which has helped stabilize wrist.

## 2020-06-16 ENCOUNTER — APPOINTMENT (OUTPATIENT)
Dept: FAMILY MEDICINE | Facility: CLINIC | Age: 65
End: 2020-06-16
Payer: MEDICARE

## 2020-06-16 VITALS
DIASTOLIC BLOOD PRESSURE: 84 MMHG | HEART RATE: 78 BPM | OXYGEN SATURATION: 98 % | HEIGHT: 60 IN | BODY MASS INDEX: 38.87 KG/M2 | SYSTOLIC BLOOD PRESSURE: 130 MMHG | WEIGHT: 198 LBS

## 2020-06-16 PROCEDURE — 99213 OFFICE O/P EST LOW 20 MIN: CPT

## 2020-06-16 RX ORDER — GABAPENTIN 100 MG/1
100 CAPSULE ORAL 3 TIMES DAILY
Qty: 30 | Refills: 0 | Status: COMPLETED | COMMUNITY
Start: 2020-06-16 | End: 2020-06-26

## 2020-06-16 NOTE — HISTORY OF PRESENT ILLNESS
[FreeTextEntry8] : Pt c/o several weeks of R lateral thigh paresthesias/pain. Pain worse when pt is active and feels as if lateral thigh is burning or sometimes numb. Denies weakness, trauma.

## 2020-06-16 NOTE — ASSESSMENT
[FreeTextEntry1] : R lateral thigh pain/paresthesias - likely meralgia paresthetica. trial of gabapentin prn, cont nsaids. pt to start medrol michael which she has at home. if no improvement in 2-3 weeks f/u in office

## 2020-06-16 NOTE — PHYSICAL EXAM
[Normal] : no acute distress, well nourished, well developed and well-appearing [No Joint Swelling] : no joint swelling [de-identified] : neg straight leg testing [No Rash] : no rash [Grossly Normal Strength/Tone] : grossly normal strength/tone

## 2020-06-30 ENCOUNTER — APPOINTMENT (OUTPATIENT)
Dept: FAMILY MEDICINE | Facility: CLINIC | Age: 65
End: 2020-06-30
Payer: MEDICARE

## 2020-06-30 VITALS
HEIGHT: 60 IN | OXYGEN SATURATION: 98 % | WEIGHT: 198 LBS | HEART RATE: 79 BPM | SYSTOLIC BLOOD PRESSURE: 130 MMHG | BODY MASS INDEX: 38.87 KG/M2 | RESPIRATION RATE: 16 BRPM | DIASTOLIC BLOOD PRESSURE: 74 MMHG

## 2020-06-30 PROCEDURE — 99214 OFFICE O/P EST MOD 30 MIN: CPT | Mod: 25

## 2020-06-30 PROCEDURE — 96372 THER/PROPH/DIAG INJ SC/IM: CPT

## 2020-06-30 RX ORDER — METHYLPREDNISOLONE 4 MG/1
4 TABLET ORAL
Qty: 1 | Refills: 0 | Status: COMPLETED | COMMUNITY
Start: 2019-06-13 | End: 2020-06-30

## 2020-07-01 RX ORDER — METHYLPRED ACET/NACL,ISO-OS/PF 40 MG/ML
40 VIAL (ML) INJECTION
Qty: 1 | Refills: 0 | Status: COMPLETED | OUTPATIENT
Start: 2020-07-01

## 2020-07-01 RX ADMIN — METHYLPREDNISOLONE ACETATE 0 MG/ML: 40 INJECTION, SUSPENSION INTRA-ARTICULAR; INTRALESIONAL; INTRAMUSCULAR; SOFT TISSUE at 00:00

## 2020-07-01 NOTE — HISTORY OF PRESENT ILLNESS
[de-identified] : Pt c/o persistent R lateral thigh pain and has worsened to radiate down her lower leg at times. Denies back pain. Pt taking ibuprofen 600mg bid with mild relief. gabapentin helped improve pain but pt had run out of meds. Pt also uses ibuprofen prn for chronic R knee pain.

## 2020-07-01 NOTE — ASSESSMENT
[FreeTextEntry1] : R meralgia paresthetica - depomedrol 40mg administered IM, pt consent given before administration and after discussion of risks/benefits, pt tolerated well. Increase gabapentin to 300mg TID. Possible adverse effects discussed with pt. refill ibuprofen prn.\par hld - refills tatin\par chronic R knee pain - refill ibuprofen prn

## 2020-07-01 NOTE — PHYSICAL EXAM
[Normal] : no acute distress, well nourished, well developed and well-appearing [No Joint Swelling] : no joint swelling [Grossly Normal Strength/Tone] : grossly normal strength/tone [No Rash] : no rash [de-identified] : neg straight leg testing. R anterolateral thigh pain/paresthesia. R knee pain

## 2020-08-11 ENCOUNTER — APPOINTMENT (OUTPATIENT)
Dept: FAMILY MEDICINE | Facility: CLINIC | Age: 65
End: 2020-08-11
Payer: MEDICARE

## 2020-08-11 VITALS
HEIGHT: 60 IN | SYSTOLIC BLOOD PRESSURE: 130 MMHG | BODY MASS INDEX: 38.87 KG/M2 | DIASTOLIC BLOOD PRESSURE: 80 MMHG | OXYGEN SATURATION: 98 % | HEART RATE: 83 BPM | RESPIRATION RATE: 16 BRPM | WEIGHT: 198 LBS

## 2020-08-11 DIAGNOSIS — M54.42 LUMBAGO WITH SCIATICA, LEFT SIDE: ICD-10-CM

## 2020-08-11 DIAGNOSIS — M54.41 LUMBAGO WITH SCIATICA, LEFT SIDE: ICD-10-CM

## 2020-08-11 DIAGNOSIS — G57.11 MERALGIA PARESTHETICA, RIGHT LOWER LIMB: ICD-10-CM

## 2020-08-11 PROCEDURE — 96372 THER/PROPH/DIAG INJ SC/IM: CPT

## 2020-08-11 PROCEDURE — 99214 OFFICE O/P EST MOD 30 MIN: CPT | Mod: 25

## 2020-08-12 PROBLEM — G57.11 MERALGIA PARESTHETICA OF RIGHT SIDE: Noted: 2020-06-16

## 2020-08-12 PROBLEM — M54.42 ACUTE BILATERAL LOW BACK PAIN WITH BILATERAL SCIATICA: Status: ACTIVE | Noted: 2020-08-11

## 2020-08-12 RX ORDER — METHYLPRED ACET/NACL,ISO-OS/PF 40 MG/ML
40 VIAL (ML) INJECTION
Qty: 1 | Refills: 0 | Status: COMPLETED | OUTPATIENT
Start: 2020-08-12

## 2020-08-12 RX ADMIN — METHYLPREDNISOLONE ACETATE 0 MG/ML: 40 INJECTION, SUSPENSION INTRA-ARTICULAR; INTRALESIONAL; INTRAMUSCULAR; SOFT TISSUE at 00:00

## 2020-08-12 NOTE — ASSESSMENT
[FreeTextEntry1] : low back pain w/ b/l sciatica - depomedrol 40mg IM administered, pt consent given before administration and after discussion of risks/benefits, pt tolerated well. cont ibuprofen prn. xr LS spine. start PT. Advised lifestyle modifications for wt loss

## 2020-08-12 NOTE — HISTORY OF PRESENT ILLNESS
[FreeTextEntry8] : Pt c/o R thigh radiculopathy x 6 weeks. Pain has persisted despite ibuprofen 600mg TID and gabapentin use. Pain now radiates down to distal toes. Pt having difficulty ambulating at times due to pain. Pt also reports having sciatic pain on L posterior thigh intermittently as well.

## 2020-09-08 ENCOUNTER — APPOINTMENT (OUTPATIENT)
Dept: MRI IMAGING | Facility: CLINIC | Age: 65
End: 2020-09-08
Payer: MEDICARE

## 2020-09-08 ENCOUNTER — OUTPATIENT (OUTPATIENT)
Dept: OUTPATIENT SERVICES | Facility: HOSPITAL | Age: 65
LOS: 1 days | End: 2020-09-08
Payer: MEDICARE

## 2020-09-08 DIAGNOSIS — M54.42 LUMBAGO WITH SCIATICA, LEFT SIDE: ICD-10-CM

## 2020-09-08 PROCEDURE — 72148 MRI LUMBAR SPINE W/O DYE: CPT | Mod: 26

## 2020-09-08 PROCEDURE — 72148 MRI LUMBAR SPINE W/O DYE: CPT

## 2020-12-08 ENCOUNTER — APPOINTMENT (OUTPATIENT)
Dept: FAMILY MEDICINE | Facility: CLINIC | Age: 65
End: 2020-12-08
Payer: MEDICARE

## 2020-12-08 VITALS
BODY MASS INDEX: 38.87 KG/M2 | SYSTOLIC BLOOD PRESSURE: 126 MMHG | HEIGHT: 60 IN | WEIGHT: 198 LBS | HEART RATE: 77 BPM | DIASTOLIC BLOOD PRESSURE: 80 MMHG | TEMPERATURE: 96.4 F | OXYGEN SATURATION: 98 %

## 2020-12-08 DIAGNOSIS — M79.651 PAIN IN RIGHT THIGH: ICD-10-CM

## 2020-12-08 PROCEDURE — 99214 OFFICE O/P EST MOD 30 MIN: CPT | Mod: 25

## 2020-12-08 PROCEDURE — 96372 THER/PROPH/DIAG INJ SC/IM: CPT

## 2020-12-08 PROCEDURE — 90662 IIV NO PRSV INCREASED AG IM: CPT

## 2020-12-08 PROCEDURE — 99072 ADDL SUPL MATRL&STAF TM PHE: CPT

## 2020-12-08 PROCEDURE — G0008: CPT

## 2020-12-08 RX ORDER — CYANOCOBALAMIN 1000 UG/ML
1000 INJECTION INTRAMUSCULAR; SUBCUTANEOUS
Qty: 0 | Refills: 0 | Status: COMPLETED | OUTPATIENT
Start: 2020-12-08

## 2020-12-08 RX ADMIN — CYANOCOBALAMIN 0 MCG/ML: 1000 INJECTION INTRAMUSCULAR; SUBCUTANEOUS at 00:00

## 2020-12-08 NOTE — HISTORY OF PRESENT ILLNESS
[de-identified] : Pt c/o persistent R lateral thigh pain and has worsened to radiate down her lower leg at times. Denies back pain. Pt taking ibuprofen 600mg bid with mild relief. gabapentin helped improve pain but pt had run out of meds. Pt also uses ibuprofen prn for chronic R knee pain.\par Pt also f/u DM, HLD, HTN, b12 defic. Pt has not been following improved diet and has regained 7 lbs in past few months. Fasting glucose usually 90-120s

## 2020-12-08 NOTE — ASSESSMENT
[FreeTextEntry1] : R lateral thigh pain, persistent - cont ibuprofen prn. Advised pt on possible AE of NSAID use, including possibility of gastritis/ulcer formation with chronic use. Advised pt to use PPI during NSAID use as well as to take medication with food. refer to physiatry for further eval\par DM, htn refill meds check a1c\par hypothyroidism - refill levothyroxine, check TFTs \par b12 defic - Administered cyanocobalamin 1000mcg IM after patient consent was given after risks and benefits discussed. Pt tolerated well.\par Risks and benefits of high dose flu vaccine discussed w/ pt. Consent given by pt, high dose flu vaccine administered. Pt tolerated well \par \par

## 2020-12-11 LAB
ALBUMIN SERPL ELPH-MCNC: 4.4 G/DL
ALP BLD-CCNC: 89 U/L
ALT SERPL-CCNC: 16 U/L
ANION GAP SERPL CALC-SCNC: 12 MMOL/L
AST SERPL-CCNC: 12 U/L
BASOPHILS # BLD AUTO: 0.08 K/UL
BASOPHILS NFR BLD AUTO: 0.8 %
BILIRUB SERPL-MCNC: 0.2 MG/DL
BUN SERPL-MCNC: 15 MG/DL
CALCIUM SERPL-MCNC: 9.6 MG/DL
CHLORIDE SERPL-SCNC: 104 MMOL/L
CHOLEST SERPL-MCNC: 163 MG/DL
CO2 SERPL-SCNC: 27 MMOL/L
CREAT SERPL-MCNC: 0.87 MG/DL
EOSINOPHIL # BLD AUTO: 0.33 K/UL
EOSINOPHIL NFR BLD AUTO: 3.1 %
ESTIMATED AVERAGE GLUCOSE: 120 MG/DL
FOLATE SERPL-MCNC: 11 NG/ML
GLUCOSE SERPL-MCNC: 74 MG/DL
HBA1C MFR BLD HPLC: 5.8 %
HCT VFR BLD CALC: 41.2 %
HDLC SERPL-MCNC: 55 MG/DL
HGB BLD-MCNC: 12.5 G/DL
IMM GRANULOCYTES NFR BLD AUTO: 0.6 %
LDLC SERPL CALC-MCNC: 75 MG/DL
LYMPHOCYTES # BLD AUTO: 3.79 K/UL
LYMPHOCYTES NFR BLD AUTO: 36.2 %
MAN DIFF?: NORMAL
MCHC RBC-ENTMCNC: 27.7 PG
MCHC RBC-ENTMCNC: 30.3 GM/DL
MCV RBC AUTO: 91.2 FL
MONOCYTES # BLD AUTO: 0.8 K/UL
MONOCYTES NFR BLD AUTO: 7.6 %
NEUTROPHILS # BLD AUTO: 5.42 K/UL
NEUTROPHILS NFR BLD AUTO: 51.7 %
NONHDLC SERPL-MCNC: 108 MG/DL
PLATELET # BLD AUTO: 362 K/UL
POTASSIUM SERPL-SCNC: 4.5 MMOL/L
PROT SERPL-MCNC: 7.8 G/DL
RBC # BLD: 4.52 M/UL
RBC # FLD: 11.8 %
SODIUM SERPL-SCNC: 142 MMOL/L
T4 FREE SERPL-MCNC: 2 NG/DL
TRIGL SERPL-MCNC: 163 MG/DL
TSH SERPL-ACNC: 0.08 UIU/ML
VIT B12 SERPL-MCNC: >2000 PG/ML
WBC # FLD AUTO: 10.48 K/UL

## 2020-12-24 ENCOUNTER — APPOINTMENT (OUTPATIENT)
Dept: PHYSICAL MEDICINE AND REHAB | Facility: CLINIC | Age: 65
End: 2020-12-24

## 2021-01-11 ENCOUNTER — APPOINTMENT (OUTPATIENT)
Dept: PHYSICAL MEDICINE AND REHAB | Facility: CLINIC | Age: 66
End: 2021-01-11

## 2021-01-13 RX ORDER — LANCETS
EACH MISCELLANEOUS
Qty: 1 | Refills: 1 | Status: ACTIVE | COMMUNITY
Start: 2021-01-13 | End: 1900-01-01

## 2021-01-13 RX ORDER — BLOOD SUGAR DIAGNOSTIC
STRIP MISCELLANEOUS DAILY
Qty: 1 | Refills: 1 | Status: ACTIVE | COMMUNITY
Start: 2021-01-13 | End: 1900-01-01

## 2021-01-13 RX ORDER — BLOOD-GLUCOSE METER
W/DEVICE EACH MISCELLANEOUS
Qty: 1 | Refills: 0 | Status: ACTIVE | COMMUNITY
Start: 2021-01-13 | End: 1900-01-01

## 2021-01-28 ENCOUNTER — APPOINTMENT (OUTPATIENT)
Dept: PHYSICAL MEDICINE AND REHAB | Facility: CLINIC | Age: 66
End: 2021-01-28
Payer: MEDICARE

## 2021-01-28 VITALS
HEART RATE: 81 BPM | TEMPERATURE: 96.2 F | RESPIRATION RATE: 16 BRPM | SYSTOLIC BLOOD PRESSURE: 145 MMHG | OXYGEN SATURATION: 98 % | DIASTOLIC BLOOD PRESSURE: 87 MMHG | HEIGHT: 61 IN | BODY MASS INDEX: 37.38 KG/M2 | WEIGHT: 198 LBS

## 2021-01-28 DIAGNOSIS — Z86.39 PERSONAL HISTORY OF OTHER ENDOCRINE, NUTRITIONAL AND METABOLIC DISEASE: ICD-10-CM

## 2021-01-28 DIAGNOSIS — Z78.9 OTHER SPECIFIED HEALTH STATUS: ICD-10-CM

## 2021-01-28 PROCEDURE — 99072 ADDL SUPL MATRL&STAF TM PHE: CPT

## 2021-01-28 PROCEDURE — 99204 OFFICE O/P NEW MOD 45 MIN: CPT

## 2021-01-28 RX ORDER — FLUTICASONE PROPIONATE 50 UG/1
50 SPRAY, METERED NASAL TWICE DAILY
Qty: 1 | Refills: 0 | Status: DISCONTINUED | COMMUNITY
Start: 2017-12-28 | End: 2021-01-28

## 2021-01-28 RX ORDER — METHYLPREDNISOLONE 4 MG/1
4 TABLET ORAL
Qty: 1 | Refills: 0 | Status: DISCONTINUED | COMMUNITY
Start: 2020-05-19 | End: 2021-01-28

## 2021-01-28 RX ORDER — METHYLPREDNISOLONE 4 MG/1
4 TABLET ORAL
Qty: 1 | Refills: 0 | Status: DISCONTINUED | COMMUNITY
Start: 2019-10-22 | End: 2021-01-28

## 2021-01-28 RX ORDER — METHYLPREDNISOLONE 4 MG/1
4 TABLET ORAL
Qty: 1 | Refills: 0 | Status: DISCONTINUED | COMMUNITY
Start: 2020-08-11 | End: 2021-01-28

## 2021-01-28 RX ORDER — CLOBETASOL PROPIONATE 0.05 G/100ML
0.05 SHAMPOO TOPICAL
Qty: 118 | Refills: 0 | Status: DISCONTINUED | COMMUNITY
Start: 2016-12-23 | End: 2021-01-28

## 2021-01-28 RX ORDER — GABAPENTIN 300 MG/1
300 CAPSULE ORAL
Qty: 90 | Refills: 0 | Status: DISCONTINUED | COMMUNITY
Start: 2020-06-30 | End: 2021-01-28

## 2021-01-28 RX ORDER — HYDROXYZINE HYDROCHLORIDE 25 MG/1
25 TABLET ORAL 3 TIMES DAILY
Qty: 60 | Refills: 0 | Status: DISCONTINUED | COMMUNITY
Start: 2019-09-12 | End: 2021-01-28

## 2021-01-28 NOTE — ASSESSMENT
[FreeTextEntry1] : Ms. CASTILLO DENNIS is a 65 year old  female who presents with bilateral LE burning pain associated with numbness, consistent with peripheral neuropathy possibly diabetic in origin, less likely related to lumbar radiculopathy. Denies any red flag signs. Will recommend:\par - Patient has tried Gabapentin in the past without significant relief. Spoke to her regarding Lyrica for which she is interested. Will try Lyrica 75mg Qhs with an increase to 75mg BID after 1 week. Advised of side effects including sedation. \par - Ordered EMG of bilateral LE to evaluate for peripheral neuropathy\par - Patient to continue Tylenol/Ibuprofen PRN\par \par RTC in 3-4 weeks. Patient in agreement with plan.

## 2021-01-28 NOTE — DATA REVIEWED
[FreeTextEntry1] : \par   MR Lumbar Spine No Cont             Final\par \par No Documents Attached\par \par \par \par \par   EXAM:  MR SPINE LUMBAR\par \par \par PROCEDURE DATE:  09/08/2020\par \par \par \par INTERPRETATION:  LUMBOSACRAL SPINE MRI\par \par CLINICAL INFORMATION: Low back pain\par TECHNIQUE: Multiplanar, multisequence MR imaging was obtained of the lumbosacral spine.\par FINDINGS:\par \par DISC LEVEL EVALUATION:\par \par L1/L2: Mild disc bulging is present without central canal or foraminal narrowing.\par L2/L3: Minimal disc bulging is present without central canal or foraminal narrowing.\par L3/L4: Mild disc bulging is present with minimal retrolisthesis of L3 with respect to L4. There is mild to moderate left and mild right foraminal narrowing.\par L4/L5: Moderate to severe facet arthrosis present with grade 1 anterolisthesis resulting in uncovering of the posterior aspect of the disc. This along with disc bulging results in moderate bilateral foraminal narrowing and moderate central canal stenosis. There is bone marrow edema within the bones of the facet joints as well as mild endplate marrow edema.\par L5/S1: The disc is intact.\par \par SPINAL ALIGNMENT: Grade 1 anterolisthesis at L4-L5. There is mild scoliosis which is convex to the left.\par DISTAL CORD AND CONUS: The distal cord is normal in appearance. The conus terminates at L1 and is unremarkable.\par SI JOINTS: Normal\par MARROW: Mild endplate marrow edema at L4-L5 with mild edema within the bones of the facet joints.\par PARASPINAL MUSCLE AND SOFT TISSUES: Normal\par INTRAABDOMINAL/INTRAPELVIC SOFT TISSUES: Normal\par \par IMPRESSION: Moderate to severe facet arthrosis at L4-L5 with grade 1 anterolisthesis and disc bulging resulting in moderate bilateral foraminal narrowing and central canal stenosis.\par \par \par \par \par \par \par GRACIE HOWARD M.D., ATTENDING RADIOLOGIST Phone #: [phone]\par This document has been electronically signed. Sep  9 2020  4:19PM\par \par  \par \par  Ordered by: REYES, JOHN M       Collected/Examined: 08Sep2020 04:29PM       \par Verified by: REYES, JOHN M 14Sep2020 10:34AM       \par  Result Communication: No patient communication needed at this time;\par Stage: Final       \par  Performed at: [facility] [place]       Resulted: 09Sep2020 04:23PM       Last Updated: 14Sep2020 10:34AM       Accession: [id number]

## 2021-01-28 NOTE — HISTORY OF PRESENT ILLNESS
[FreeTextEntry1] : Ms. CASTILLO DENNIS is a 65 year old  female who presents with bilateral leg pain. \par \par Location: R>L lateral thigh down legs to feet\par Onset:7 months, gradual onset\par Provocation/Palliative:Better with rest, worst with activities\par Quality:Burning\par Radiation:Down both legs into feet\par Severity:7/10\par Timing:Not improving\par \par Has numbness in same distribution of pain. Denies any associated leg weakness. Denies any loss of bowel/bladder control or any groin numbness.\par Previous medications trialed:Ibuprofen, Gabapentin\par Previous procedures relevant to complaint:None\par Conservative therapy tried?:NSAIDs\par \par Of note patient has been a diabetic for many years, now with controlled A1C\par

## 2021-01-28 NOTE — PHYSICAL EXAM
[FreeTextEntry1] : PE:\par Constitutional: In NAD, calm and cooperative\par HEENT: NCAT, Anicteric sclera, no lid-lag\par Cardio: Extremities appear pink and well perfused, no peripheral edema\par Respiratory: Normal respiratory effort on room air, no accessory muscle use\par Skin: no rashes seen on exposed skin, no visible abrasions\par Psych: Normal affect, intact judgment and insight\par Neuro: Awake, alert and oriented x 3, see below for focused neurological exam\par MSK:\par 	Inspection: no gross swelling identified\par 	Palpation: No tenderness of the bilateral lower lumbar paraspinals\par 	ROM: Mala Pain at end lumbar extension/flexion\par 	Strength: 5/5 strength in bilateral lower extremities\par 	Reflexes: 1+ Patella reflex bilaterally, 1+ Achilles reflex bilaterally, negative clonus bilaterally\par 	Sensation: Intact to light touch in bilateral lower extremities\par Special tests:\par SLR:negative bilaterally\par EMILIANO:negative bilaterally\par FADIR: negative bilaterally\par Facet loading: negative bilaterally

## 2021-02-19 ENCOUNTER — APPOINTMENT (OUTPATIENT)
Dept: PHYSICAL MEDICINE AND REHAB | Facility: CLINIC | Age: 66
End: 2021-02-19

## 2021-02-22 ENCOUNTER — APPOINTMENT (OUTPATIENT)
Dept: PHYSICAL MEDICINE AND REHAB | Facility: CLINIC | Age: 66
End: 2021-02-22

## 2021-03-22 ENCOUNTER — APPOINTMENT (OUTPATIENT)
Dept: PHYSICAL MEDICINE AND REHAB | Facility: CLINIC | Age: 66
End: 2021-03-22

## 2021-03-23 ENCOUNTER — APPOINTMENT (OUTPATIENT)
Dept: FAMILY MEDICINE | Facility: CLINIC | Age: 66
End: 2021-03-23

## 2021-05-17 ENCOUNTER — APPOINTMENT (OUTPATIENT)
Dept: PHYSICAL MEDICINE AND REHAB | Facility: CLINIC | Age: 66
End: 2021-05-17

## 2021-05-27 ENCOUNTER — APPOINTMENT (OUTPATIENT)
Dept: PHYSICAL MEDICINE AND REHAB | Facility: CLINIC | Age: 66
End: 2021-05-27
Payer: MEDICARE

## 2021-05-27 VITALS
HEIGHT: 61 IN | HEART RATE: 80 BPM | BODY MASS INDEX: 37.38 KG/M2 | DIASTOLIC BLOOD PRESSURE: 79 MMHG | OXYGEN SATURATION: 99 % | SYSTOLIC BLOOD PRESSURE: 144 MMHG | WEIGHT: 198 LBS | RESPIRATION RATE: 16 BRPM | TEMPERATURE: 96 F

## 2021-05-27 PROCEDURE — 99072 ADDL SUPL MATRL&STAF TM PHE: CPT

## 2021-05-27 PROCEDURE — 99214 OFFICE O/P EST MOD 30 MIN: CPT

## 2021-05-27 NOTE — HISTORY OF PRESENT ILLNESS
[FreeTextEntry1] : Ms. CASTILLO DENNIS is a 65 year old  female who presents for follow up. At last visit, patient was trialed on Lyrica and offered an EMG. Patient says she gets 100% relief from the Lyrica. Denied any side effects. She recently ran out and has noticed the same pain came back. Denies any new symptoms. \par \par Location: R>L lateral thigh down legs to feet\par Onset:About a year ago, gradual onset\par Provocation/Palliative:Better with rest, worst with activities\par Quality:Burning\par Radiation:Down both legs into feet\par Severity:5/10\par Timing:Not improving\par \par No bowel/bladder changes. No groin numbness.

## 2021-05-27 NOTE — ASSESSMENT
[FreeTextEntry1] : Ms. CASTILLO DENNIS is a 65 year old  female who presents with bilateral LE burning pain associated with numbness, consistent with peripheral neuropathy possibly diabetic in origin, less likely related to lumbar radiculopathy. Denies any red flag signs. Will recommend:\par - Will restart Lyrica 75mg BID with 1 pill at night and then increase to BID after a few days. Denies any side effects from medication. ISTOP checked, [id number]\par - Patient would like to hold off on the EMG of bilateral LE to evaluate for peripheral neuropathy\par - Patient to continue Tylenol/Ibuprofen PRN\par \par RTC in 3 months. If symptoms are persistent or worsen, would strongly recommended EMG/neurology consult. Patient in agreement with plan.

## 2021-05-27 NOTE — PHYSICAL EXAM
[FreeTextEntry1] : PE:\par Constitutional: In NAD, calm and cooperative\par HEENT: NCAT, Anicteric sclera, no lid-lag\par Cardio: Extremities appear pink and well perfused, no peripheral edema\par Respiratory: Normal respiratory effort on room air, no accessory muscle use\par Skin: no rashes seen on exposed skin, no visible abrasions\par Psych: Normal affect, intact judgment and insight\par Neuro: Awake, alert and oriented x 3, see below for focused neurological exam\par MSK:\par 	Inspection: no gross swelling identified\par 	Palpation: No tenderness of the bilateral lower lumbar paraspinals\par 	ROM: No Pain at end lumbar extension/flexion\par 	Strength: 5/5 strength in bilateral lower extremities\par 	Reflexes: 1+ Patella reflex bilaterally, 1+ Achilles reflex bilaterally, negative clonus bilaterally\par 	Sensation: Intact to light touch in bilateral lower extremities\par Special tests:\par SLR:negative bilaterally\par EMILIANO:negative bilaterally\par FADIR: negative bilaterally\par Facet loading: negative bilaterally

## 2021-06-03 ENCOUNTER — RX RENEWAL (OUTPATIENT)
Age: 66
End: 2021-06-03

## 2021-06-22 ENCOUNTER — RX RENEWAL (OUTPATIENT)
Age: 66
End: 2021-06-22

## 2021-08-30 ENCOUNTER — APPOINTMENT (OUTPATIENT)
Dept: PHYSICAL MEDICINE AND REHAB | Facility: CLINIC | Age: 66
End: 2021-08-30

## 2021-08-31 ENCOUNTER — RX RENEWAL (OUTPATIENT)
Age: 66
End: 2021-08-31

## 2021-09-07 ENCOUNTER — APPOINTMENT (OUTPATIENT)
Dept: FAMILY MEDICINE | Facility: CLINIC | Age: 66
End: 2021-09-07
Payer: MEDICARE

## 2021-09-07 VITALS
DIASTOLIC BLOOD PRESSURE: 80 MMHG | OXYGEN SATURATION: 98 % | SYSTOLIC BLOOD PRESSURE: 130 MMHG | TEMPERATURE: 97 F | HEART RATE: 76 BPM | WEIGHT: 180 LBS | BODY MASS INDEX: 33.99 KG/M2 | HEIGHT: 61 IN

## 2021-09-07 PROCEDURE — 99214 OFFICE O/P EST MOD 30 MIN: CPT | Mod: 25

## 2021-09-07 PROCEDURE — 36415 COLL VENOUS BLD VENIPUNCTURE: CPT

## 2021-09-07 PROCEDURE — 96372 THER/PROPH/DIAG INJ SC/IM: CPT

## 2021-09-08 RX ORDER — CYANOCOBALAMIN 1000 UG/ML
1000 INJECTION INTRAMUSCULAR; SUBCUTANEOUS
Qty: 0 | Refills: 0 | Status: COMPLETED | OUTPATIENT
Start: 2021-09-08

## 2021-09-08 RX ADMIN — CYANOCOBALAMIN 0 MCG/ML: 1000 INJECTION INTRAMUSCULAR; SUBCUTANEOUS at 00:00

## 2021-09-08 NOTE — ASSESSMENT
[FreeTextEntry1] : DM, htn hypothyroid- cont meds check labs\par b12 defic - Administered cyanocobalamin 1000mcg IM after patient consent was given after risks and benefits discussed. Pt tolerated well.

## 2021-09-08 NOTE — HISTORY OF PRESENT ILLNESS
[de-identified] : Pt c/o persistent R lateral thigh pain and has worsened to radiate down her lower leg at times. Denies back pain. Pt has improvement w/ lyrica 75mg bid. Pt has to take ibuprofen for breakthrough pain. Pt also uses ibuprofen prn for chronic R knee pain. Pt to f/u with Dr. Mcmanus this week. \par Pt also f/u DM, HLD, HTN, b12 defic. Pt has not been following improved diet and has regained 7 lbs in past few months. Fasting glucose usually 90-120s

## 2021-09-09 LAB
ALBUMIN SERPL ELPH-MCNC: 4.5 G/DL
ALP BLD-CCNC: 98 U/L
ALT SERPL-CCNC: 36 U/L
ANION GAP SERPL CALC-SCNC: 15 MMOL/L
APPEARANCE: CLEAR
AST SERPL-CCNC: 30 U/L
BACTERIA: NEGATIVE
BASOPHILS # BLD AUTO: 0.07 K/UL
BASOPHILS NFR BLD AUTO: 1.1 %
BILIRUB SERPL-MCNC: 0.2 MG/DL
BILIRUBIN URINE: NEGATIVE
BLOOD URINE: NEGATIVE
BUN SERPL-MCNC: 20 MG/DL
CALCIUM SERPL-MCNC: 9.1 MG/DL
CHLORIDE SERPL-SCNC: 105 MMOL/L
CHOLEST SERPL-MCNC: 126 MG/DL
CO2 SERPL-SCNC: 20 MMOL/L
COLOR: YELLOW
CREAT SERPL-MCNC: 1.1 MG/DL
EOSINOPHIL # BLD AUTO: 0.23 K/UL
EOSINOPHIL NFR BLD AUTO: 3.6 %
ESTIMATED AVERAGE GLUCOSE: 140 MG/DL
FOLATE SERPL-MCNC: 12.2 NG/ML
GLUCOSE QUALITATIVE U: NEGATIVE
GLUCOSE SERPL-MCNC: 116 MG/DL
HBA1C MFR BLD HPLC: 6.5 %
HCT VFR BLD CALC: 44.4 %
HDLC SERPL-MCNC: 47 MG/DL
HGB BLD-MCNC: 13.8 G/DL
HYALINE CASTS: 4 /LPF
IMM GRANULOCYTES NFR BLD AUTO: 0.5 %
KETONES URINE: NEGATIVE
LDLC SERPL CALC-MCNC: 52 MG/DL
LEUKOCYTE ESTERASE URINE: ABNORMAL
LYMPHOCYTES # BLD AUTO: 2.75 K/UL
LYMPHOCYTES NFR BLD AUTO: 42.6 %
MAN DIFF?: NORMAL
MCHC RBC-ENTMCNC: 28.8 PG
MCHC RBC-ENTMCNC: 31.1 GM/DL
MCV RBC AUTO: 92.7 FL
MICROSCOPIC-UA: NORMAL
MONOCYTES # BLD AUTO: 0.53 K/UL
MONOCYTES NFR BLD AUTO: 8.2 %
NEUTROPHILS # BLD AUTO: 2.85 K/UL
NEUTROPHILS NFR BLD AUTO: 44 %
NITRITE URINE: NEGATIVE
NONHDLC SERPL-MCNC: 79 MG/DL
PH URINE: 6
PLATELET # BLD AUTO: 332 K/UL
POTASSIUM SERPL-SCNC: 4.6 MMOL/L
PROT SERPL-MCNC: 7.7 G/DL
PROTEIN URINE: ABNORMAL
RBC # BLD: 4.79 M/UL
RBC # FLD: 12.9 %
RED BLOOD CELLS URINE: 2 /HPF
SODIUM SERPL-SCNC: 140 MMOL/L
SPECIFIC GRAVITY URINE: 1.02
SQUAMOUS EPITHELIAL CELLS: 5 /HPF
T4 FREE SERPL-MCNC: 1.5 NG/DL
TRIGL SERPL-MCNC: 133 MG/DL
TSH SERPL-ACNC: 0.57 UIU/ML
UROBILINOGEN URINE: NORMAL
VIT B12 SERPL-MCNC: >2000 PG/ML
WBC # FLD AUTO: 6.46 K/UL
WHITE BLOOD CELLS URINE: 7 /HPF

## 2021-09-20 ENCOUNTER — RX RENEWAL (OUTPATIENT)
Age: 66
End: 2021-09-20

## 2021-10-05 ENCOUNTER — RX RENEWAL (OUTPATIENT)
Age: 66
End: 2021-10-05

## 2021-10-05 ENCOUNTER — APPOINTMENT (OUTPATIENT)
Dept: FAMILY MEDICINE | Facility: CLINIC | Age: 66
End: 2021-10-05

## 2021-10-07 ENCOUNTER — NON-APPOINTMENT (OUTPATIENT)
Age: 66
End: 2021-10-07

## 2021-11-08 ENCOUNTER — RX RENEWAL (OUTPATIENT)
Age: 66
End: 2021-11-08

## 2021-11-08 ENCOUNTER — NON-APPOINTMENT (OUTPATIENT)
Age: 66
End: 2021-11-08

## 2021-12-01 ENCOUNTER — RX RENEWAL (OUTPATIENT)
Age: 66
End: 2021-12-01

## 2021-12-28 ENCOUNTER — APPOINTMENT (OUTPATIENT)
Dept: FAMILY MEDICINE | Facility: CLINIC | Age: 66
End: 2021-12-28
Payer: MEDICARE

## 2021-12-28 ENCOUNTER — APPOINTMENT (OUTPATIENT)
Dept: FAMILY MEDICINE | Facility: CLINIC | Age: 66
End: 2021-12-28

## 2021-12-28 DIAGNOSIS — J04.0 ACUTE LARYNGITIS: ICD-10-CM

## 2021-12-28 PROCEDURE — 99441: CPT

## 2022-01-10 ENCOUNTER — APPOINTMENT (OUTPATIENT)
Dept: PHYSICAL MEDICINE AND REHAB | Facility: CLINIC | Age: 67
End: 2022-01-10
Payer: MEDICARE

## 2022-01-10 PROCEDURE — 99213 OFFICE O/P EST LOW 20 MIN: CPT

## 2022-01-11 NOTE — HISTORY OF PRESENT ILLNESS
[FreeTextEntry1] : Ms. CASTILLO DENNIS is a 66 year old  female who presents for follow up. At last visit, she was restarted on Lyrica 75mg BID and continued on Tylenol PRN. She has been out of the medication for 2 weeks and has noticed her symptoms worsening. Denies any new symptoms. \par \par Location: R>L lateral thigh down legs to feet\par Onset:Chronic\par Provocation/Palliative:Better with rest, worst with activities and prolonged standing\par Quality:Burning\par Radiation:Down both legs into feet\par Severity:moderate now, very minimal with lyrica. \par Timing:Improved greatly with lyrica\par \par No bowel/bladder changes. No groin numbness.

## 2022-01-11 NOTE — ASSESSMENT
[FreeTextEntry1] : Ms. CASTILLO DENNIS is a 65 year old  female who presents with bilateral LE burning pain associated with numbness, consistent with peripheral neuropathy possibly diabetic in origin, less likely related to lumbar radiculopathy. Patient has found significant relief with Lyrica.  Denies any red flag signs. Will recommend:\par - Will restart Lyrica 75mg BID with 1 pill at night and then increase to BID after a few days. Denies any side effects from medication. ISTOP checked, #[id number] \par - Patient would like to hold off on the EMG of bilateral LE to evaluate for peripheral neuropathy given she has found relief with Lyrica\par - Patient to continue Tylenol PRN\par \par RTC in 3 months. Advised patient to continue routine blood tests, especially of her kidneys, every few months with her PCP for which she agreed. If symptoms are persistent or worsen, would strongly recommended EMG/neurology consult. Patient in agreement with plan.

## 2022-02-03 ENCOUNTER — RX RENEWAL (OUTPATIENT)
Age: 67
End: 2022-02-03

## 2022-02-07 ENCOUNTER — RX RENEWAL (OUTPATIENT)
Age: 67
End: 2022-02-07

## 2022-03-18 ENCOUNTER — NON-APPOINTMENT (OUTPATIENT)
Age: 67
End: 2022-03-18

## 2022-03-18 ENCOUNTER — RX RENEWAL (OUTPATIENT)
Age: 67
End: 2022-03-18

## 2022-04-25 ENCOUNTER — NON-APPOINTMENT (OUTPATIENT)
Age: 67
End: 2022-04-25

## 2022-06-01 ENCOUNTER — NON-APPOINTMENT (OUTPATIENT)
Age: 67
End: 2022-06-01

## 2022-06-01 ENCOUNTER — RX RENEWAL (OUTPATIENT)
Age: 67
End: 2022-06-01

## 2022-06-02 ENCOUNTER — RX RENEWAL (OUTPATIENT)
Age: 67
End: 2022-06-02

## 2022-06-07 ENCOUNTER — APPOINTMENT (OUTPATIENT)
Dept: FAMILY MEDICINE | Facility: CLINIC | Age: 67
End: 2022-06-07

## 2022-06-27 ENCOUNTER — RX RENEWAL (OUTPATIENT)
Age: 67
End: 2022-06-27

## 2022-07-11 ENCOUNTER — NON-APPOINTMENT (OUTPATIENT)
Age: 67
End: 2022-07-11

## 2022-07-21 ENCOUNTER — APPOINTMENT (OUTPATIENT)
Dept: PHYSICAL MEDICINE AND REHAB | Facility: CLINIC | Age: 67
End: 2022-07-21

## 2022-08-16 ENCOUNTER — RX RENEWAL (OUTPATIENT)
Age: 67
End: 2022-08-16

## 2022-09-08 ENCOUNTER — APPOINTMENT (OUTPATIENT)
Dept: PHYSICAL MEDICINE AND REHAB | Facility: CLINIC | Age: 67
End: 2022-09-08

## 2022-09-19 ENCOUNTER — APPOINTMENT (OUTPATIENT)
Dept: PHYSICAL MEDICINE AND REHAB | Facility: CLINIC | Age: 67
End: 2022-09-19

## 2022-09-19 PROCEDURE — 99442: CPT

## 2022-09-19 RX ORDER — PREGABALIN 75 MG/1
75 CAPSULE ORAL TWICE DAILY
Qty: 60 | Refills: 0 | Status: COMPLETED | COMMUNITY
Start: 2022-09-19 | End: 2022-09-19

## 2022-09-19 NOTE — ASSESSMENT
[FreeTextEntry1] : 66 yo F w bilateral LE burning pain associated with numbness, consistent with peripheral neuropathy due to DM and possibly lumbar radiculopathy.\par \par I Stop was reviewed and patient is consistent with history presented\par This report was requested by: Efren Fang | Reference #: [id number]\par \par Dr. Mcmanus's clinical note from 1/10/22 reviewed\par \par Renewing patient Lyrica 75 mg twice daily p.o. for neuropathy.  She has been stable on it for a while and wishes to continue it at this point in time.  Denies any side effects from this pain medication.\par \par Monitor for sedation, dizziness and any signs of respiratory depression. Avoid taking together with opioid pain medicines and other drugs that depress the central nervous system function. Avoid sudden cessation after prolonged use, due to risk of seizures. Risks and benefits were explained and patient expressed understanding. \par \par Follow-up 4 weeks with Dr. Mcmanus in office\par \par This note was generated using Dragon medical dictation software. A reasonable effort had been made for proofreading its contents, but spelling mistakes or grammatical errors may still remain. If there are any questions or points of clarification needed please notify my office.

## 2022-09-19 NOTE — HISTORY OF PRESENT ILLNESS
[FreeTextEntry1] : Patient being evaluated by televisit>\par \par CASTILLO DENNIS is a 67 year old female with long standing history of bilateral leg paraesthesias due to neuropathy. Patient's symptoms had been well-controlled on Lyrica 75mg PO BID. She denies any side effects and is asking to continue this medication. Patient that she ran out of her medication a few weeks ago and notes that he pain had started to return.\par \par As per Dr. Mcmanus's note from 1/10/22:\par \par Location: R>L lateral thigh down legs to feet\par Onset:Chronic\par Provocation/Palliative:Better with rest, worst with activities and prolonged standing\par Quality:Burning\par Radiation:Down both legs into feet\par Severity:moderate now, very minimal with lyrica. \par Timing:Improved greatly with lyrica\par \par No bowel/bladder changes. No groin numbness.

## 2022-10-06 ENCOUNTER — APPOINTMENT (OUTPATIENT)
Dept: FAMILY MEDICINE | Facility: CLINIC | Age: 67
End: 2022-10-06

## 2022-10-06 VITALS
OXYGEN SATURATION: 97 % | WEIGHT: 188 LBS | TEMPERATURE: 97.8 F | BODY MASS INDEX: 35.5 KG/M2 | HEART RATE: 80 BPM | SYSTOLIC BLOOD PRESSURE: 130 MMHG | DIASTOLIC BLOOD PRESSURE: 82 MMHG | HEIGHT: 61 IN

## 2022-10-06 DIAGNOSIS — M85.80 OTHER SPECIFIED DISORDERS OF BONE DENSITY AND STRUCTURE, UNSPECIFIED SITE: ICD-10-CM

## 2022-10-06 DIAGNOSIS — Z12.11 ENCOUNTER FOR SCREENING FOR MALIGNANT NEOPLASM OF COLON: ICD-10-CM

## 2022-10-06 DIAGNOSIS — Z00.00 ENCOUNTER FOR GENERAL ADULT MEDICAL EXAMINATION W/OUT ABNORMAL FINDINGS: ICD-10-CM

## 2022-10-06 DIAGNOSIS — B00.2 HERPESVIRAL GINGIVOSTOMATITIS AND PHARYNGOTONSILLITIS: ICD-10-CM

## 2022-10-06 PROCEDURE — 90686 IIV4 VACC NO PRSV 0.5 ML IM: CPT

## 2022-10-06 PROCEDURE — G0008: CPT

## 2022-10-06 PROCEDURE — G0439: CPT

## 2022-10-06 PROCEDURE — 96372 THER/PROPH/DIAG INJ SC/IM: CPT

## 2022-10-06 PROCEDURE — 99213 OFFICE O/P EST LOW 20 MIN: CPT | Mod: 25

## 2022-10-06 RX ORDER — CYANOCOBALAMIN 1000 UG/ML
1000 INJECTION INTRAMUSCULAR; SUBCUTANEOUS
Qty: 0 | Refills: 0 | Status: COMPLETED | OUTPATIENT
Start: 2022-10-06

## 2022-10-06 RX ORDER — PREDNISONE 10 MG/1
10 TABLET ORAL
Qty: 7 | Refills: 0 | Status: COMPLETED | COMMUNITY
Start: 2021-12-28 | End: 2022-10-06

## 2022-10-06 RX ORDER — VALACYCLOVIR 1 G/1
1 TABLET, FILM COATED ORAL
Qty: 1 | Refills: 0 | Status: ACTIVE | COMMUNITY
Start: 2022-10-06 | End: 1900-01-01

## 2022-10-06 RX ADMIN — CYANOCOBALAMIN 1 MCG/ML: 1000 INJECTION INTRAMUSCULAR; SUBCUTANEOUS at 00:00

## 2022-10-09 ENCOUNTER — MED ADMIN CHARGE (OUTPATIENT)
Age: 67
End: 2022-10-09

## 2022-10-09 NOTE — ASSESSMENT
[FreeTextEntry1] : hypothyroidism - refill levothyroxine, check TFTs\par oral herpes - flared, valtrex prn\par b12 defic - Administered cyanocobalamin 1000mcg IM after patient consent was given after risks and benefits discussed. Pt tolerated well. recheck b12 level\par HLD - cont statin, check flp, hepatic function\par DM - cont metformin, glimepiride, check a1c\par htn- cont meds, controlled today. check labs\par cont f/u with cardio\par Healthy diet and regular exercise regimen discussed w/ pt.\par Screening labs ordered\par referral to GI/colorectal for screening colonoscopy\par Advised routine pap smear and mammogram. pt refuses further pap smears\par Any questions call office\par Risks and benefits of high dose flu vaccine discussed w/ pt. Consent given by pt, high dose flu vaccine administered. Pt tolerated well

## 2022-10-09 NOTE — HEALTH RISK ASSESSMENT
[Very Good] : ~his/her~ current health as very good [Fair] :  ~his/her~ mood as fair [No] : No [No falls in past year] : Patient reported no falls in the past year [0] : 1) Little interest or pleasure doing things: Not at all (0) [1] : 2) Feeling down, depressed, or hopeless for several days (1) [No Retinopathy] : No retinopathy [Patient reported mammogram was normal] : Patient reported mammogram was normal [Patient declined PAP Smear] : Patient declined PAP Smear [With Family] : lives with family [] :  [Feels Safe at Home] : Feels safe at home [Fully functional (bathing, dressing, toileting, transferring, walking, feeding)] : Fully functional (bathing, dressing, toileting, transferring, walking, feeding) [Fully functional (using the telephone, shopping, preparing meals, housekeeping, doing laundry, using] : Fully functional and needs no help or supervision to perform IADLs (using the telephone, shopping, preparing meals, housekeeping, doing laundry, using transportation, managing medications and managing finances) [Reports normal functional visual acuity (ie: able to read med bottle)] : Reports normal functional visual acuity [Reports changes in dental health] : Reports changes in dental health [Smoke Detector] : smoke detector [Carbon Monoxide Detector] : carbon monoxide detector [Seat Belt] :  uses seat belt [Patient reported bone density results were abnormal] : Patient reported bone density results were abnormal [With Patient/Caregiver] : , with patient/caregiver [Reviewed no changes] : Reviewed, no changes [Designated Healthcare Proxy] : Designated healthcare proxy [Name: ___] : Health Care Proxy's Name: [unfilled]  [Relationship: ___] : Relationship: [unfilled] [Aggressive treatment] : aggressive treatment [EyeExamDate] : 03/20 [Change in mental status noted] : No change in mental status noted [Language] : denies difficulty with language [Behavior] : denies difficulty with behavior [Learning/Retaining New Information] : denies difficulty learning/retaining new information [Handling Complex Tasks] : denies difficulty handling complex tasks [Reasoning] : denies difficulty with reasoning [Spatial Ability and Orientation] : denies difficulty with spatial ability and orientation [Reports changes in hearing] : Reports no changes in hearing [Reports changes in vision] : Reports no changes in vision [MammogramDate] : 03/18 [BoneDensityDate] : 05/20 [BoneDensityComments] : osteopenia [ColonoscopyComments] : overdue, pt has family hx of colon CA in her brother [de-identified] : pt sees ophtho [AdvancecareDate] : 10/22

## 2022-10-09 NOTE — HISTORY OF PRESENT ILLNESS
[de-identified] : Pt in office for CPE/AWV. Pt f/u DM, HLD, HTN, b12 defic. Pt has not been following improved diet and has regained weight in past few months. Fasting glucose usually 90-120s. Pt compliant w/ meds, needs recheck of labs. Denies CP, palpitations, dyspnea, n/v.\par Pt follows w/ PM&R Dr. Mcmanus for chronic neuropathy, controlled on lyrica\par Pt c/o cold sore L mouth x 2 days\par \par Nonsmoker\par ETOH use denies\par Drug use denies\par Exercises walking daily\par Diet poor high carb/portions\par

## 2022-10-11 LAB
25(OH)D3 SERPL-MCNC: 30.9 NG/ML
ALBUMIN SERPL ELPH-MCNC: 4.7 G/DL
ALP BLD-CCNC: 96 U/L
ALT SERPL-CCNC: 19 U/L
ANION GAP SERPL CALC-SCNC: 12 MMOL/L
APPEARANCE: CLEAR
AST SERPL-CCNC: 16 U/L
BACTERIA: NEGATIVE
BASOPHILS # BLD AUTO: 0.09 K/UL
BASOPHILS NFR BLD AUTO: 0.9 %
BILIRUB SERPL-MCNC: 0.4 MG/DL
BILIRUBIN URINE: NEGATIVE
BLOOD URINE: NEGATIVE
BUN SERPL-MCNC: 15 MG/DL
CALCIUM SERPL-MCNC: 9.6 MG/DL
CHLORIDE SERPL-SCNC: 100 MMOL/L
CHOLEST SERPL-MCNC: 198 MG/DL
CO2 SERPL-SCNC: 26 MMOL/L
COLOR: YELLOW
CREAT SERPL-MCNC: 0.87 MG/DL
EGFR: 72 ML/MIN/1.73M2
EOSINOPHIL # BLD AUTO: 0.32 K/UL
EOSINOPHIL NFR BLD AUTO: 3.1 %
ESTIMATED AVERAGE GLUCOSE: 137 MG/DL
GLUCOSE QUALITATIVE U: NEGATIVE
GLUCOSE SERPL-MCNC: 116 MG/DL
HBA1C MFR BLD HPLC: 6.4 %
HCT VFR BLD CALC: 41.3 %
HDLC SERPL-MCNC: 64 MG/DL
HGB BLD-MCNC: 13 G/DL
HYALINE CASTS: 2 /LPF
IMM GRANULOCYTES NFR BLD AUTO: 0.6 %
KETONES URINE: NEGATIVE
LDLC SERPL CALC-MCNC: 108 MG/DL
LEUKOCYTE ESTERASE URINE: NEGATIVE
LYMPHOCYTES # BLD AUTO: 4.37 K/UL
LYMPHOCYTES NFR BLD AUTO: 41.7 %
MAN DIFF?: NORMAL
MCHC RBC-ENTMCNC: 28.8 PG
MCHC RBC-ENTMCNC: 31.5 GM/DL
MCV RBC AUTO: 91.4 FL
MICROSCOPIC-UA: NORMAL
MONOCYTES # BLD AUTO: 0.6 K/UL
MONOCYTES NFR BLD AUTO: 5.7 %
NEUTROPHILS # BLD AUTO: 5.04 K/UL
NEUTROPHILS NFR BLD AUTO: 48 %
NITRITE URINE: NEGATIVE
NONHDLC SERPL-MCNC: 133 MG/DL
PH URINE: 6
PLATELET # BLD AUTO: 316 K/UL
POTASSIUM SERPL-SCNC: 4 MMOL/L
PROT SERPL-MCNC: 8 G/DL
PROTEIN URINE: NORMAL
RBC # BLD: 4.52 M/UL
RBC # FLD: 13.3 %
RED BLOOD CELLS URINE: 3 /HPF
SODIUM SERPL-SCNC: 138 MMOL/L
SPECIFIC GRAVITY URINE: 1.02
SQUAMOUS EPITHELIAL CELLS: 5 /HPF
T4 FREE SERPL-MCNC: 1.4 NG/DL
TRIGL SERPL-MCNC: 125 MG/DL
TSH SERPL-ACNC: 1.31 UIU/ML
UROBILINOGEN URINE: NORMAL
WBC # FLD AUTO: 10.48 K/UL
WHITE BLOOD CELLS URINE: 3 /HPF

## 2022-11-21 ENCOUNTER — APPOINTMENT (OUTPATIENT)
Dept: PHYSICAL MEDICINE AND REHAB | Facility: CLINIC | Age: 67
End: 2022-11-21

## 2022-11-28 ENCOUNTER — RX RENEWAL (OUTPATIENT)
Age: 67
End: 2022-11-28

## 2022-11-29 ENCOUNTER — NON-APPOINTMENT (OUTPATIENT)
Age: 67
End: 2022-11-29

## 2022-12-01 ENCOUNTER — APPOINTMENT (OUTPATIENT)
Dept: FAMILY MEDICINE | Facility: CLINIC | Age: 67
End: 2022-12-01

## 2022-12-08 ENCOUNTER — APPOINTMENT (OUTPATIENT)
Dept: PHYSICAL MEDICINE AND REHAB | Facility: CLINIC | Age: 67
End: 2022-12-08

## 2022-12-08 VITALS
HEART RATE: 85 BPM | WEIGHT: 198 LBS | HEIGHT: 61 IN | DIASTOLIC BLOOD PRESSURE: 76 MMHG | RESPIRATION RATE: 14 BRPM | BODY MASS INDEX: 37.38 KG/M2 | SYSTOLIC BLOOD PRESSURE: 145 MMHG

## 2022-12-08 PROCEDURE — 99213 OFFICE O/P EST LOW 20 MIN: CPT | Mod: GC

## 2022-12-08 NOTE — HISTORY OF PRESENT ILLNESS
[FreeTextEntry1] : Ms. CASTILLO DENNIS is a 67 year old  female who presents for follow up. At last visit, she was continued on Lyrica 75mg BID and Tylenol PRN. She has says she is interested in trying to cut down the dose of her lyrica to once a day. Denies any new symptoms. \par \par Location: R>L lateral thigh down legs to feet\par Onset:Chronic\par Provocation/Palliative:Better with rest, worst with activities and prolonged standing\par Quality:Burning\par Radiation:Down both legs into feet\par Severity:Very minimal with lyrica. \par Timing:Improved greatly with lyrica\par \par No bowel/bladder changes. No groin numbness.

## 2022-12-08 NOTE — ASSESSMENT
[FreeTextEntry1] : Ms. CASTILLO DENNIS is a 67 year old female who presents with bilateral LE burning pain associated with numbness, consistent with peripheral neuropathy possibly diabetic in origin, less likely related to lumbar radiculopathy. Patient has found significant relief with Lyrica. Denies any red flag signs. Will recommend:\par - Continue Lyrica 75mg. She can take 1-2 times a day, depending on her pain MDD:2 caps. Denies any side effects from medication. ISTOP checked, #[id number] \par - Patient would like to continue to  hold off on the EMG of bilateral LE to evaluate for peripheral neuropathy given she has found relief with Lyrica\par - Patient to continue Tylenol PRN\par - Continue w/ DM management and glucose control per PCP recommendations.\par \par RTC in 3-4 months. Advised patient to continue routine blood tests, especially of her kidneys, every 6-12 months with her PCP for which she agreed. If symptoms are persistent or worsen, would strongly recommended EMG/neurology consult. Patient in agreement with plan.

## 2022-12-08 NOTE — PHYSICAL EXAM
[FreeTextEntry1] : PE:\par Constitutional: In NAD, calm and cooperative\par MSK:\par 	Inspection: no gross swelling identified\par 	Palpation: No tenderness of the bilateral lower lumbar paraspinals\par 	ROM: No Pain at end lumbar extension/flexion\par 	Strength: 5/5 strength in bilateral lower extremities\par 	Reflexes: 1+ Patella reflex bilaterally, 1+ Achilles reflex bilaterally, negative clonus bilaterally\par 	Sensation: Decreased sensation to LT and PP on RLE vs Left\par Special tests:\par SLR:negative bilaterally\par EMILIANO:negative bilaterally\par FADIR: negative bilaterally\par Facet loading: negative bilaterally

## 2023-02-10 ENCOUNTER — RX RENEWAL (OUTPATIENT)
Age: 68
End: 2023-02-10

## 2023-04-03 ENCOUNTER — RX RENEWAL (OUTPATIENT)
Age: 68
End: 2023-04-03

## 2023-04-13 ENCOUNTER — NON-APPOINTMENT (OUTPATIENT)
Age: 68
End: 2023-04-13

## 2023-04-24 ENCOUNTER — RX RENEWAL (OUTPATIENT)
Age: 68
End: 2023-04-24

## 2023-04-25 ENCOUNTER — APPOINTMENT (OUTPATIENT)
Dept: FAMILY MEDICINE | Facility: CLINIC | Age: 68
End: 2023-04-25
Payer: MEDICARE

## 2023-04-25 VITALS
BODY MASS INDEX: 36.06 KG/M2 | WEIGHT: 191 LBS | SYSTOLIC BLOOD PRESSURE: 130 MMHG | HEART RATE: 70 BPM | DIASTOLIC BLOOD PRESSURE: 72 MMHG | HEIGHT: 61 IN | OXYGEN SATURATION: 98 %

## 2023-04-25 DIAGNOSIS — G89.29 PAIN IN RIGHT KNEE: ICD-10-CM

## 2023-04-25 DIAGNOSIS — M54.32 SCIATICA, LEFT SIDE: ICD-10-CM

## 2023-04-25 DIAGNOSIS — M25.561 PAIN IN RIGHT KNEE: ICD-10-CM

## 2023-04-25 PROCEDURE — 99214 OFFICE O/P EST MOD 30 MIN: CPT | Mod: 25

## 2023-04-25 PROCEDURE — 96372 THER/PROPH/DIAG INJ SC/IM: CPT

## 2023-04-25 RX ORDER — CYANOCOBALAMIN 1000 UG/ML
1000 INJECTION INTRAMUSCULAR; SUBCUTANEOUS
Qty: 0 | Refills: 0 | Status: COMPLETED | OUTPATIENT
Start: 2023-04-25

## 2023-04-25 RX ORDER — GLIMEPIRIDE 1 MG/1
1 TABLET ORAL
Qty: 90 | Refills: 1 | Status: COMPLETED | COMMUNITY
Start: 2018-04-03 | End: 2023-04-25

## 2023-04-25 RX ADMIN — CYANOCOBALAMIN 0 MCG/ML: 1000 INJECTION INTRAMUSCULAR; SUBCUTANEOUS at 00:00

## 2023-04-30 NOTE — HISTORY OF PRESENT ILLNESS
[de-identified] : Pt f/u DM, HLD, HTN, b12 defic, chronic back pain, hypot. Pt has not been following improved diet and has regained weight in past few months. Fasting glucose usually 90-120s. Pt compliant w/ meds, needs recheck of labs. Denies CP, palpitations, dyspnea, n/v.  Pt follows w/ PM&R Dr. Mcmanus for chronic neuropathy, controlled on lyrica. Pt uses ibuprofen prn back pain

## 2023-04-30 NOTE — ASSESSMENT
[FreeTextEntry1] : DM, htn hypothyroid- cont meds check labs\par b12 defic - Administered cyanocobalamin 1000mcg IM after patient consent was given after risks and benefits discussed. Pt tolerated well.\par chronic back/knee pain - refill ibuprofen 600mg prn

## 2023-04-30 NOTE — PHYSICAL EXAM
[Soft] : abdomen soft [Non Tender] : non-tender [Normal] : affect was normal and insight and judgment were intact [de-identified] : back pain

## 2023-05-01 LAB
ALBUMIN SERPL ELPH-MCNC: 4.2 G/DL
ALP BLD-CCNC: 102 U/L
ALT SERPL-CCNC: 21 U/L
ANION GAP SERPL CALC-SCNC: 12 MMOL/L
AST SERPL-CCNC: 16 U/L
BILIRUB SERPL-MCNC: 0.4 MG/DL
BUN SERPL-MCNC: 14 MG/DL
CALCIUM SERPL-MCNC: 9.6 MG/DL
CHLORIDE SERPL-SCNC: 102 MMOL/L
CHOLEST SERPL-MCNC: 179 MG/DL
CO2 SERPL-SCNC: 25 MMOL/L
CREAT SERPL-MCNC: 0.93 MG/DL
EGFR: 67 ML/MIN/1.73M2
ESTIMATED AVERAGE GLUCOSE: 154 MG/DL
FOLATE SERPL-MCNC: 9.6 NG/ML
GLUCOSE SERPL-MCNC: 105 MG/DL
HBA1C MFR BLD HPLC: 7 %
HDLC SERPL-MCNC: 64 MG/DL
LDLC SERPL CALC-MCNC: 86 MG/DL
NONHDLC SERPL-MCNC: 115 MG/DL
POTASSIUM SERPL-SCNC: 4.9 MMOL/L
PROT SERPL-MCNC: 7.7 G/DL
SODIUM SERPL-SCNC: 139 MMOL/L
TRIGL SERPL-MCNC: 148 MG/DL
VIT B12 SERPL-MCNC: >2000 PG/ML

## 2023-05-24 DIAGNOSIS — Z12.39 ENCOUNTER FOR OTHER SCREENING FOR MALIGNANT NEOPLASM OF BREAST: ICD-10-CM

## 2023-06-05 ENCOUNTER — NON-APPOINTMENT (OUTPATIENT)
Age: 68
End: 2023-06-05

## 2023-07-10 ENCOUNTER — RX RENEWAL (OUTPATIENT)
Age: 68
End: 2023-07-10

## 2023-07-12 ENCOUNTER — NON-APPOINTMENT (OUTPATIENT)
Age: 68
End: 2023-07-12

## 2023-08-10 ENCOUNTER — APPOINTMENT (OUTPATIENT)
Dept: PHYSICAL MEDICINE AND REHAB | Facility: CLINIC | Age: 68
End: 2023-08-10

## 2023-10-05 ENCOUNTER — APPOINTMENT (OUTPATIENT)
Dept: FAMILY MEDICINE | Facility: CLINIC | Age: 68
End: 2023-10-05
Payer: MEDICARE

## 2023-10-05 VITALS
HEART RATE: 90 BPM | DIASTOLIC BLOOD PRESSURE: 80 MMHG | TEMPERATURE: 97.2 F | WEIGHT: 194 LBS | HEIGHT: 61 IN | RESPIRATION RATE: 14 BRPM | SYSTOLIC BLOOD PRESSURE: 110 MMHG | BODY MASS INDEX: 36.63 KG/M2 | OXYGEN SATURATION: 98 %

## 2023-10-05 DIAGNOSIS — I10 ESSENTIAL (PRIMARY) HYPERTENSION: ICD-10-CM

## 2023-10-05 DIAGNOSIS — Z23 ENCOUNTER FOR IMMUNIZATION: ICD-10-CM

## 2023-10-05 DIAGNOSIS — E03.9 HYPOTHYROIDISM, UNSPECIFIED: ICD-10-CM

## 2023-10-05 DIAGNOSIS — E53.8 DEFICIENCY OF OTHER SPECIFIED B GROUP VITAMINS: ICD-10-CM

## 2023-10-05 DIAGNOSIS — E78.5 HYPERLIPIDEMIA, UNSPECIFIED: ICD-10-CM

## 2023-10-05 PROCEDURE — 90662 IIV NO PRSV INCREASED AG IM: CPT

## 2023-10-05 PROCEDURE — G0008: CPT

## 2023-10-05 PROCEDURE — 99214 OFFICE O/P EST MOD 30 MIN: CPT | Mod: 25

## 2023-10-05 RX ORDER — LEVOTHYROXINE SODIUM 0.15 MG/1
150 TABLET ORAL
Qty: 90 | Refills: 1 | Status: ACTIVE | COMMUNITY
Start: 2021-06-22 | End: 1900-01-01

## 2023-10-06 ENCOUNTER — NON-APPOINTMENT (OUTPATIENT)
Age: 68
End: 2023-10-06

## 2023-10-09 LAB
ALBUMIN SERPL ELPH-MCNC: 4.2 G/DL
ALP BLD-CCNC: 89 U/L
ALT SERPL-CCNC: 29 U/L
ANION GAP SERPL CALC-SCNC: 17 MMOL/L
AST SERPL-CCNC: 21 U/L
BASOPHILS # BLD AUTO: 0.09 K/UL
BASOPHILS NFR BLD AUTO: 0.8 %
BILIRUB SERPL-MCNC: 0.4 MG/DL
BUN SERPL-MCNC: 19 MG/DL
CALCIUM SERPL-MCNC: 9.9 MG/DL
CHLORIDE SERPL-SCNC: 103 MMOL/L
CHOLEST SERPL-MCNC: 161 MG/DL
CO2 SERPL-SCNC: 22 MMOL/L
CREAT SERPL-MCNC: 1.02 MG/DL
EGFR: 59 ML/MIN/1.73M2
EOSINOPHIL # BLD AUTO: 0.4 K/UL
EOSINOPHIL NFR BLD AUTO: 3.7 %
ESTIMATED AVERAGE GLUCOSE: 163 MG/DL
FOLATE SERPL-MCNC: 10 NG/ML
GLUCOSE SERPL-MCNC: 192 MG/DL
HBA1C MFR BLD HPLC: 7.3 %
HCT VFR BLD CALC: 38.7 %
HDLC SERPL-MCNC: 63 MG/DL
HGB BLD-MCNC: 12 G/DL
IMM GRANULOCYTES NFR BLD AUTO: 0.6 %
LDLC SERPL CALC-MCNC: 76 MG/DL
LYMPHOCYTES # BLD AUTO: 3.44 K/UL
LYMPHOCYTES NFR BLD AUTO: 32.2 %
MAN DIFF?: NORMAL
MCHC RBC-ENTMCNC: 28.2 PG
MCHC RBC-ENTMCNC: 31 GM/DL
MCV RBC AUTO: 90.8 FL
MONOCYTES # BLD AUTO: 0.67 K/UL
MONOCYTES NFR BLD AUTO: 6.3 %
NEUTROPHILS # BLD AUTO: 6.01 K/UL
NEUTROPHILS NFR BLD AUTO: 56.4 %
NONHDLC SERPL-MCNC: 98 MG/DL
PLATELET # BLD AUTO: 304 K/UL
POTASSIUM SERPL-SCNC: 4.2 MMOL/L
PROT SERPL-MCNC: 7.6 G/DL
RBC # BLD: 4.26 M/UL
RBC # FLD: 12.7 %
SODIUM SERPL-SCNC: 141 MMOL/L
TRIGL SERPL-MCNC: 130 MG/DL
VIT B12 SERPL-MCNC: 686 PG/ML
WBC # FLD AUTO: 10.67 K/UL

## 2023-11-21 ENCOUNTER — RX RENEWAL (OUTPATIENT)
Age: 68
End: 2023-11-21

## 2023-11-22 ENCOUNTER — NON-APPOINTMENT (OUTPATIENT)
Age: 68
End: 2023-11-22

## 2023-11-30 ENCOUNTER — APPOINTMENT (OUTPATIENT)
Dept: PHYSICAL MEDICINE AND REHAB | Facility: CLINIC | Age: 68
End: 2023-11-30
Payer: MEDICARE

## 2023-11-30 VITALS
WEIGHT: 198 LBS | HEIGHT: 61 IN | HEART RATE: 73 BPM | DIASTOLIC BLOOD PRESSURE: 84 MMHG | RESPIRATION RATE: 15 BRPM | BODY MASS INDEX: 37.38 KG/M2 | SYSTOLIC BLOOD PRESSURE: 154 MMHG

## 2023-11-30 DIAGNOSIS — M54.16 RADICULOPATHY, LUMBAR REGION: ICD-10-CM

## 2023-11-30 DIAGNOSIS — E11.9 TYPE 2 DIABETES MELLITUS W/OUT COMPLICATIONS: ICD-10-CM

## 2023-11-30 DIAGNOSIS — G62.9 POLYNEUROPATHY, UNSPECIFIED: ICD-10-CM

## 2023-11-30 PROCEDURE — 99214 OFFICE O/P EST MOD 30 MIN: CPT

## 2024-01-02 ENCOUNTER — RX RENEWAL (OUTPATIENT)
Age: 69
End: 2024-01-02

## 2024-02-12 ENCOUNTER — RX RENEWAL (OUTPATIENT)
Age: 69
End: 2024-02-12

## 2024-02-26 ENCOUNTER — RX RENEWAL (OUTPATIENT)
Age: 69
End: 2024-02-26

## 2024-02-27 ENCOUNTER — NON-APPOINTMENT (OUTPATIENT)
Age: 69
End: 2024-02-27

## 2024-04-01 ENCOUNTER — APPOINTMENT (OUTPATIENT)
Dept: FAMILY MEDICINE | Facility: CLINIC | Age: 69
End: 2024-04-01

## 2024-04-25 ENCOUNTER — NON-APPOINTMENT (OUTPATIENT)
Age: 69
End: 2024-04-25

## 2024-05-30 ENCOUNTER — RX RENEWAL (OUTPATIENT)
Age: 69
End: 2024-05-30

## 2024-05-30 RX ORDER — IBUPROFEN 600 MG/1
600 TABLET, FILM COATED ORAL
Qty: 60 | Refills: 1 | Status: ACTIVE | COMMUNITY
Start: 2017-07-13 | End: 1900-01-01

## 2024-06-12 ENCOUNTER — RX RENEWAL (OUTPATIENT)
Age: 69
End: 2024-06-12

## 2024-06-12 ENCOUNTER — NON-APPOINTMENT (OUTPATIENT)
Age: 69
End: 2024-06-12

## 2024-06-13 RX ORDER — PREGABALIN 75 MG/1
75 CAPSULE ORAL
Qty: 60 | Refills: 0 | Status: ACTIVE | COMMUNITY
Start: 2021-01-28 | End: 1900-01-01

## 2024-06-13 RX ORDER — HYDROCORTISONE 25 MG/G
2.5 CREAM TOPICAL
Qty: 28.35 | Refills: 1 | Status: ACTIVE | COMMUNITY
Start: 2018-09-04 | End: 1900-01-01

## 2024-07-11 ENCOUNTER — APPOINTMENT (OUTPATIENT)
Dept: FAMILY MEDICINE | Facility: CLINIC | Age: 69
End: 2024-07-11
Payer: MEDICARE

## 2024-07-11 ENCOUNTER — NON-APPOINTMENT (OUTPATIENT)
Age: 69
End: 2024-07-11

## 2024-07-11 VITALS
BODY MASS INDEX: 37.38 KG/M2 | SYSTOLIC BLOOD PRESSURE: 126 MMHG | TEMPERATURE: 97.7 F | OXYGEN SATURATION: 96 % | WEIGHT: 198 LBS | DIASTOLIC BLOOD PRESSURE: 72 MMHG | HEART RATE: 76 BPM | HEIGHT: 61 IN

## 2024-07-11 DIAGNOSIS — E53.8 DEFICIENCY OF OTHER SPECIFIED B GROUP VITAMINS: ICD-10-CM

## 2024-07-11 DIAGNOSIS — E11.9 TYPE 2 DIABETES MELLITUS W/OUT COMPLICATIONS: ICD-10-CM

## 2024-07-11 DIAGNOSIS — E78.5 HYPERLIPIDEMIA, UNSPECIFIED: ICD-10-CM

## 2024-07-11 DIAGNOSIS — I10 ESSENTIAL (PRIMARY) HYPERTENSION: ICD-10-CM

## 2024-07-11 DIAGNOSIS — R06.09 OTHER FORMS OF DYSPNEA: ICD-10-CM

## 2024-07-11 DIAGNOSIS — E03.9 HYPOTHYROIDISM, UNSPECIFIED: ICD-10-CM

## 2024-07-11 PROCEDURE — 99214 OFFICE O/P EST MOD 30 MIN: CPT

## 2024-07-11 PROCEDURE — 93000 ELECTROCARDIOGRAM COMPLETE: CPT

## 2024-07-16 LAB
ALBUMIN SERPL ELPH-MCNC: 4.2 G/DL
ALP BLD-CCNC: 89 U/L
ALT SERPL-CCNC: 26 U/L
APPEARANCE: CLEAR
AST SERPL-CCNC: 18 U/L
BACTERIA: NEGATIVE /HPF
BASOPHILS # BLD AUTO: 0.1 K/UL
BASOPHILS NFR BLD AUTO: 1.1 %
BILIRUB SERPL-MCNC: 0.3 MG/DL
BILIRUBIN URINE: NEGATIVE
BLOOD URINE: NEGATIVE
BUN SERPL-MCNC: 19 MG/DL
CALCIUM SERPL-MCNC: 9.2 MG/DL
CAST: 0 /LPF
CHLORIDE SERPL-SCNC: 104 MMOL/L
CHOLEST SERPL-MCNC: 200 MG/DL
CO2 SERPL-SCNC: 24 MMOL/L
COLOR: YELLOW
CREAT SERPL-MCNC: 0.9 MG/DL
CREAT SPEC-SCNC: 91 MG/DL
EGFR: 69 ML/MIN/1.73M2
EOSINOPHIL # BLD AUTO: 0.4 K/UL
EOSINOPHIL NFR BLD AUTO: 4.3 %
EPITHELIAL CELLS: 2 /HPF
ESTIMATED AVERAGE GLUCOSE: 166 MG/DL
GLUCOSE QUALITATIVE U: >=1000 MG/DL
GLUCOSE SERPL-MCNC: 216 MG/DL
HBA1C MFR BLD HPLC: 7.4 %
HCT VFR BLD CALC: 39.7 %
HGB BLD-MCNC: 12.1 G/DL
IMM GRANULOCYTES NFR BLD AUTO: 0.9 %
KETONES URINE: NEGATIVE MG/DL
LEUKOCYTE ESTERASE URINE: NEGATIVE
LYMPHOCYTES # BLD AUTO: 3.04 K/UL
LYMPHOCYTES NFR BLD AUTO: 32.3 %
MAN DIFF?: NORMAL
MCHC RBC-ENTMCNC: 28.4 PG
MCHC RBC-ENTMCNC: 30.5 GM/DL
MICROALBUMIN 24H UR DL<=1MG/L-MCNC: <1.2 MG/DL
MICROALBUMIN/CREAT 24H UR-RTO: NORMAL MG/G
MICROSCOPIC-UA: NORMAL
MONOCYTES # BLD AUTO: 0.67 K/UL
MONOCYTES NFR BLD AUTO: 7.1 %
NEUTROPHILS # BLD AUTO: 5.12 K/UL
NEUTROPHILS NFR BLD AUTO: 54.3 %
NITRITE URINE: NEGATIVE
NONHDLC SERPL-MCNC: 140 MG/DL
PH URINE: 6
PLATELET # BLD AUTO: 288 K/UL
POTASSIUM SERPL-SCNC: 4.4 MMOL/L
PROT SERPL-MCNC: 7.5 G/DL
PROTEIN URINE: NEGATIVE MG/DL
RBC # BLD: 4.26 M/UL
RBC # FLD: 12.3 %
RED BLOOD CELLS URINE: 1 /HPF
SODIUM SERPL-SCNC: 141 MMOL/L
SPECIFIC GRAVITY URINE: 1.02
T4 FREE SERPL-MCNC: 1.7 NG/DL
TRIGL SERPL-MCNC: 230 MG/DL
TSH SERPL-ACNC: 1.37 UIU/ML
WBC # FLD AUTO: 9.41 K/UL
WHITE BLOOD CELLS URINE: 0 /HPF

## 2024-07-16 RX ORDER — DAPAGLIFLOZIN 10 MG/1
10 TABLET, FILM COATED ORAL
Qty: 90 | Refills: 1 | Status: ACTIVE | COMMUNITY
Start: 2024-07-16 | End: 1900-01-01

## 2024-08-06 ENCOUNTER — RX RENEWAL (OUTPATIENT)
Age: 69
End: 2024-08-06

## 2024-08-26 ENCOUNTER — RX RENEWAL (OUTPATIENT)
Age: 69
End: 2024-08-26

## 2024-09-25 ENCOUNTER — RX RENEWAL (OUTPATIENT)
Age: 69
End: 2024-09-25

## 2024-09-30 ENCOUNTER — APPOINTMENT (OUTPATIENT)
Dept: PHYSICAL MEDICINE AND REHAB | Facility: CLINIC | Age: 69
End: 2024-09-30

## 2024-10-02 NOTE — HISTORY OF PRESENT ILLNESS
[FreeTextEntry1] : Ms. CASTILLO DENNIS is a 69 year old female who presents for follow up. At last visit, she was continued on Lyrica 75mg 1-2x/day, ordered a new MRI L Spine and advised continued DM management per PCP. She did not undergo the MRI L Spine.    Location: R>>>L lateral thigh, previously down to feet Onset:Chronic, no inciting events Provocation/Palliative:Better with rest, worst with activities and prolonged standing Quality:Burning Radiation:Only to R lateral thigh now Severity:Mild overall Timing:Improved greatly with lyrica  No bowel/bladder changes. No groin numbness.

## 2024-10-02 NOTE — ASSESSMENT
[FreeTextEntry1] : Ms. CASTILLO DENNIS is a 68 year old female who presents with bilateral LE burning pain associated with numbness, consistent with peripheral neuropathy possibly diabetic in origin, less likely but possibly related to lumbar radiculopathy. Patient has found significant relief with Lyrica. Denies any red flag signs. Will recommend: - Continue Lyrica 75mg. She can take 1-2 times a day, depending on her pain MDD:2 caps. Denies any side effects from medication. - Patient does not wish to do EMG/NCS at this time - Given it has been 3 years since prior MRI L Spine and she is still having some symptoms, will order new MRI L Spine - Patient to continue Tylenol PRN - Continue w/ DM management and glucose control per PCP recommendations.  RTC in 3-4 months. Advised patient to continue routine blood tests, especially of her kidneys, every 6-12 months with her PCP for which she agreed. Patient in agreement with plan.

## 2024-10-02 NOTE — PHYSICAL EXAM
[FreeTextEntry1] : PE:  Constitutional: In NAD, calm and cooperative  MSK:   Inspection: no gross swelling identified  Palpation: No tenderness of the bilateral lower lumbar paraspinals  ROM: No significant pain at end lumbar extension/flexion  Strength: 5/5 strength in bilateral lower extremities  Reflexes: 1+ Patella reflex bilaterally, 1+ Achilles reflex bilaterally, negative clonus bilaterally  Sensation:Decreased sensation to light touch over R lateral thigh compared to L lateral thigh otherwise SILT in bilateral LE  Special tests: SLR:negative bilaterally EMILIANO:negative bilaterally FADIR: negative bilaterally Facet loading: negative bilaterally.

## 2024-10-28 ENCOUNTER — APPOINTMENT (OUTPATIENT)
Dept: FAMILY MEDICINE | Facility: CLINIC | Age: 69
End: 2024-10-28

## 2024-11-07 ENCOUNTER — RX RENEWAL (OUTPATIENT)
Age: 69
End: 2024-11-07

## 2024-11-22 ENCOUNTER — APPOINTMENT (OUTPATIENT)
Dept: FAMILY MEDICINE | Facility: CLINIC | Age: 69
End: 2024-11-22

## 2024-11-22 VITALS
WEIGHT: 191 LBS | SYSTOLIC BLOOD PRESSURE: 130 MMHG | HEIGHT: 61 IN | HEART RATE: 77 BPM | TEMPERATURE: 97.7 F | BODY MASS INDEX: 36.06 KG/M2 | OXYGEN SATURATION: 97 % | DIASTOLIC BLOOD PRESSURE: 80 MMHG

## 2024-11-22 DIAGNOSIS — F41.9 ANXIETY DISORDER, UNSPECIFIED: ICD-10-CM

## 2024-11-22 DIAGNOSIS — I10 ESSENTIAL (PRIMARY) HYPERTENSION: ICD-10-CM

## 2024-11-22 DIAGNOSIS — E78.5 HYPERLIPIDEMIA, UNSPECIFIED: ICD-10-CM

## 2024-11-22 DIAGNOSIS — E03.9 HYPOTHYROIDISM, UNSPECIFIED: ICD-10-CM

## 2024-11-22 DIAGNOSIS — E11.9 TYPE 2 DIABETES MELLITUS W/OUT COMPLICATIONS: ICD-10-CM

## 2024-11-22 DIAGNOSIS — E53.8 DEFICIENCY OF OTHER SPECIFIED B GROUP VITAMINS: ICD-10-CM

## 2024-11-22 PROCEDURE — 99214 OFFICE O/P EST MOD 30 MIN: CPT

## 2024-11-26 LAB
ALBUMIN SERPL ELPH-MCNC: 4.4 G/DL
ALP BLD-CCNC: 104 U/L
ALT SERPL-CCNC: 23 U/L
ANION GAP SERPL CALC-SCNC: 20 MMOL/L
AST SERPL-CCNC: 38 U/L
BILIRUB SERPL-MCNC: 0.3 MG/DL
BUN SERPL-MCNC: 22 MG/DL
CALCIUM SERPL-MCNC: 9.5 MG/DL
CHLORIDE SERPL-SCNC: 101 MMOL/L
CHOLEST SERPL-MCNC: 210 MG/DL
CO2 SERPL-SCNC: 18 MMOL/L
CREAT SERPL-MCNC: 0.98 MG/DL
EGFR: 62 ML/MIN/1.73M2
FOLATE SERPL-MCNC: 14.9 NG/ML
GLUCOSE SERPL-MCNC: 148 MG/DL
HDLC SERPL-MCNC: 61 MG/DL
LDLC SERPL CALC-MCNC: 114 MG/DL
NONHDLC SERPL-MCNC: 150 MG/DL
POTASSIUM SERPL-SCNC: 6.2 MMOL/L
PROT SERPL-MCNC: 8.5 G/DL
SODIUM SERPL-SCNC: 139 MMOL/L
T4 FREE SERPL-MCNC: 1.8 NG/DL
TRIGL SERPL-MCNC: 207 MG/DL
TSH SERPL-ACNC: 1.99 UIU/ML
VIT B12 SERPL-MCNC: >2000 PG/ML

## 2024-12-02 ENCOUNTER — RX RENEWAL (OUTPATIENT)
Age: 69
End: 2024-12-02

## 2025-01-24 ENCOUNTER — RX RENEWAL (OUTPATIENT)
Age: 70
End: 2025-01-24

## 2025-02-18 ENCOUNTER — APPOINTMENT (OUTPATIENT)
Dept: FAMILY MEDICINE | Facility: CLINIC | Age: 70
End: 2025-02-18
Payer: MEDICARE

## 2025-02-18 VITALS
SYSTOLIC BLOOD PRESSURE: 130 MMHG | BODY MASS INDEX: 35.87 KG/M2 | HEART RATE: 82 BPM | WEIGHT: 190 LBS | HEIGHT: 61 IN | DIASTOLIC BLOOD PRESSURE: 80 MMHG | TEMPERATURE: 97.3 F | OXYGEN SATURATION: 97 %

## 2025-02-18 DIAGNOSIS — E78.5 HYPERLIPIDEMIA, UNSPECIFIED: ICD-10-CM

## 2025-02-18 DIAGNOSIS — M54.42 LUMBAGO WITH SCIATICA, LEFT SIDE: ICD-10-CM

## 2025-02-18 DIAGNOSIS — E53.8 DEFICIENCY OF OTHER SPECIFIED B GROUP VITAMINS: ICD-10-CM

## 2025-02-18 DIAGNOSIS — J39.9 DISEASE OF UPPER RESPIRATORY TRACT, UNSPECIFIED: ICD-10-CM

## 2025-02-18 DIAGNOSIS — M54.41 LUMBAGO WITH SCIATICA, LEFT SIDE: ICD-10-CM

## 2025-02-18 DIAGNOSIS — M85.80 OTHER SPECIFIED DISORDERS OF BONE DENSITY AND STRUCTURE, UNSPECIFIED SITE: ICD-10-CM

## 2025-02-18 DIAGNOSIS — E03.9 HYPOTHYROIDISM, UNSPECIFIED: ICD-10-CM

## 2025-02-18 DIAGNOSIS — E11.9 TYPE 2 DIABETES MELLITUS W/OUT COMPLICATIONS: ICD-10-CM

## 2025-02-18 DIAGNOSIS — I10 ESSENTIAL (PRIMARY) HYPERTENSION: ICD-10-CM

## 2025-02-18 PROCEDURE — G2211 COMPLEX E/M VISIT ADD ON: CPT

## 2025-02-18 PROCEDURE — 99214 OFFICE O/P EST MOD 30 MIN: CPT

## 2025-02-18 RX ORDER — FLUTICASONE PROPIONATE 50 UG/1
50 SPRAY, METERED NASAL
Qty: 1 | Refills: 0 | Status: ACTIVE | COMMUNITY
Start: 2025-02-18 | End: 1900-01-01

## 2025-02-28 LAB
25(OH)D3 SERPL-MCNC: 43.6 NG/ML
ALBUMIN SERPL ELPH-MCNC: 4.1 G/DL
ALP BLD-CCNC: 104 U/L
ALT SERPL-CCNC: 24 U/L
ANION GAP SERPL CALC-SCNC: 15 MMOL/L
AST SERPL-CCNC: 20 U/L
BASOPHILS # BLD AUTO: 0.09 K/UL
BASOPHILS NFR BLD AUTO: 1.2 %
BILIRUB SERPL-MCNC: 0.5 MG/DL
BUN SERPL-MCNC: 17 MG/DL
CALCIUM SERPL-MCNC: 9 MG/DL
CHLORIDE SERPL-SCNC: 104 MMOL/L
CHOLEST SERPL-MCNC: 201 MG/DL
CO2 SERPL-SCNC: 23 MMOL/L
CREAT SERPL-MCNC: 0.87 MG/DL
EGFR: 71 ML/MIN/1.73M2
EOSINOPHIL # BLD AUTO: 0.26 K/UL
EOSINOPHIL NFR BLD AUTO: 3.4 %
ESTIMATED AVERAGE GLUCOSE: 166 MG/DL
FOLATE SERPL-MCNC: 9.4 NG/ML
GLUCOSE SERPL-MCNC: 149 MG/DL
HBA1C MFR BLD HPLC: 7.4 %
HCT VFR BLD CALC: 48.2 %
HDLC SERPL-MCNC: 61 MG/DL
HGB BLD-MCNC: 15 G/DL
IMM GRANULOCYTES NFR BLD AUTO: 0.4 %
LDLC SERPL CALC-MCNC: 117 MG/DL
LYMPHOCYTES # BLD AUTO: 2.73 K/UL
LYMPHOCYTES NFR BLD AUTO: 35.5 %
MAN DIFF?: NORMAL
MCHC RBC-ENTMCNC: 28.7 PG
MCHC RBC-ENTMCNC: 31.1 G/DL
MCV RBC AUTO: 92.2 FL
MONOCYTES # BLD AUTO: 0.58 K/UL
MONOCYTES NFR BLD AUTO: 7.5 %
NEUTROPHILS # BLD AUTO: 4.01 K/UL
NEUTROPHILS NFR BLD AUTO: 52 %
NONHDLC SERPL-MCNC: 140 MG/DL
PLATELET # BLD AUTO: 282 K/UL
POTASSIUM SERPL-SCNC: 4.1 MMOL/L
PROT SERPL-MCNC: 7.6 G/DL
RBC # BLD: 5.23 M/UL
RBC # FLD: 13.2 %
SODIUM SERPL-SCNC: 141 MMOL/L
T4 FREE SERPL-MCNC: 1.7 NG/DL
TRIGL SERPL-MCNC: 131 MG/DL
TSH SERPL-ACNC: 0.5 UIU/ML
VIT B12 SERPL-MCNC: 726 PG/ML
WBC # FLD AUTO: 7.7 K/UL

## 2025-03-11 ENCOUNTER — APPOINTMENT (OUTPATIENT)
Dept: COLORECTAL SURGERY | Facility: CLINIC | Age: 70
End: 2025-03-11

## 2025-03-17 ENCOUNTER — APPOINTMENT (OUTPATIENT)
Dept: FAMILY MEDICINE | Facility: CLINIC | Age: 70
End: 2025-03-17

## 2025-03-24 ENCOUNTER — RX RENEWAL (OUTPATIENT)
Age: 70
End: 2025-03-24

## 2025-03-28 ENCOUNTER — APPOINTMENT (OUTPATIENT)
Dept: FAMILY MEDICINE | Facility: CLINIC | Age: 70
End: 2025-03-28

## 2025-04-14 ENCOUNTER — APPOINTMENT (OUTPATIENT)
Dept: FAMILY MEDICINE | Facility: CLINIC | Age: 70
End: 2025-04-14
Payer: MEDICARE

## 2025-04-14 VITALS
TEMPERATURE: 97.6 F | DIASTOLIC BLOOD PRESSURE: 80 MMHG | HEART RATE: 80 BPM | BODY MASS INDEX: 35.68 KG/M2 | SYSTOLIC BLOOD PRESSURE: 130 MMHG | WEIGHT: 189 LBS | OXYGEN SATURATION: 96 % | HEIGHT: 61 IN

## 2025-04-14 DIAGNOSIS — E11.9 TYPE 2 DIABETES MELLITUS W/OUT COMPLICATIONS: ICD-10-CM

## 2025-04-14 DIAGNOSIS — E53.8 DEFICIENCY OF OTHER SPECIFIED B GROUP VITAMINS: ICD-10-CM

## 2025-04-14 DIAGNOSIS — F41.9 ANXIETY DISORDER, UNSPECIFIED: ICD-10-CM

## 2025-04-14 DIAGNOSIS — G62.9 POLYNEUROPATHY, UNSPECIFIED: ICD-10-CM

## 2025-04-14 PROCEDURE — 96372 THER/PROPH/DIAG INJ SC/IM: CPT

## 2025-04-14 PROCEDURE — 99214 OFFICE O/P EST MOD 30 MIN: CPT | Mod: 25

## 2025-04-14 RX ORDER — ORAL SEMAGLUTIDE 7 MG/1
7 TABLET ORAL
Qty: 90 | Refills: 0 | Status: ACTIVE | COMMUNITY
Start: 2025-04-14 | End: 1900-01-01

## 2025-04-14 RX ORDER — CYANOCOBALAMIN 1000 UG/ML
1000 INJECTION, SOLUTION INTRAMUSCULAR; SUBCUTANEOUS
Qty: 0 | Refills: 0 | Status: COMPLETED | OUTPATIENT
Start: 2025-04-14

## 2025-04-14 RX ADMIN — CYANOCOBALAMIN 0 MCG/ML: 1000 INJECTION, SOLUTION INTRAMUSCULAR; SUBCUTANEOUS at 00:00

## 2025-04-18 ENCOUNTER — APPOINTMENT (OUTPATIENT)
Dept: COLORECTAL SURGERY | Facility: CLINIC | Age: 70
End: 2025-04-18

## 2025-05-05 ENCOUNTER — RX RENEWAL (OUTPATIENT)
Age: 70
End: 2025-05-05

## 2025-05-05 RX ORDER — ATORVASTATIN CALCIUM 10 MG/1
10 TABLET, FILM COATED ORAL
Qty: 90 | Refills: 1 | Status: ACTIVE | COMMUNITY
Start: 2025-05-05 | End: 1900-01-01

## 2025-05-12 ENCOUNTER — RX RENEWAL (OUTPATIENT)
Age: 70
End: 2025-05-12

## 2025-05-19 ENCOUNTER — RX RENEWAL (OUTPATIENT)
Age: 70
End: 2025-05-19

## 2025-06-09 ENCOUNTER — TRANSCRIPTION ENCOUNTER (OUTPATIENT)
Age: 70
End: 2025-06-09

## 2025-06-09 ENCOUNTER — APPOINTMENT (OUTPATIENT)
Dept: FAMILY MEDICINE | Facility: CLINIC | Age: 70
End: 2025-06-09

## 2025-06-16 ENCOUNTER — RX RENEWAL (OUTPATIENT)
Age: 70
End: 2025-06-16

## 2025-07-17 ENCOUNTER — RX RENEWAL (OUTPATIENT)
Age: 70
End: 2025-07-17

## 2025-09-02 ENCOUNTER — RX RENEWAL (OUTPATIENT)
Age: 70
End: 2025-09-02